# Patient Record
Sex: MALE | Race: WHITE | NOT HISPANIC OR LATINO | Employment: FULL TIME | URBAN - METROPOLITAN AREA
[De-identification: names, ages, dates, MRNs, and addresses within clinical notes are randomized per-mention and may not be internally consistent; named-entity substitution may affect disease eponyms.]

---

## 2022-06-15 ENCOUNTER — OFFICE VISIT (OUTPATIENT)
Dept: FAMILY MEDICINE | Facility: CLINIC | Age: 57
End: 2022-06-15
Payer: COMMERCIAL

## 2022-06-15 VITALS
RESPIRATION RATE: 16 BRPM | DIASTOLIC BLOOD PRESSURE: 80 MMHG | SYSTOLIC BLOOD PRESSURE: 130 MMHG | TEMPERATURE: 98.2 F | WEIGHT: 229 LBS | BODY MASS INDEX: 29.39 KG/M2 | OXYGEN SATURATION: 99 % | HEART RATE: 84 BPM | HEIGHT: 74 IN

## 2022-06-15 DIAGNOSIS — Z76.89 ENCOUNTER TO ESTABLISH CARE: Primary | ICD-10-CM

## 2022-06-15 PROCEDURE — 3008F BODY MASS INDEX DOCD: CPT | Performed by: NURSE PRACTITIONER

## 2022-06-15 PROCEDURE — 99396 PREV VISIT EST AGE 40-64: CPT | Performed by: NURSE PRACTITIONER

## 2022-06-15 ASSESSMENT — PATIENT HEALTH QUESTIONNAIRE - PHQ9: SUM OF ALL RESPONSES TO PHQ9 QUESTIONS 1 & 2: 0

## 2022-06-15 ASSESSMENT — ENCOUNTER SYMPTOMS
LIGHT-HEADEDNESS: 0
WHEEZING: 0
ABDOMINAL PAIN: 0
VOMITING: 0
DIARRHEA: 1
FATIGUE: 1
COUGH: 1
SHORTNESS OF BREATH: 1
HEADACHES: 0
CONSTIPATION: 0
NAUSEA: 0

## 2022-06-15 ASSESSMENT — PAIN SCALES - GENERAL: PAINLEVEL: 0-NO PAIN

## 2022-06-15 NOTE — PATIENT INSTRUCTIONS
Pt has hx of Afib, has been cardioverted x2. Pt states last cardioversion was in 2010.      Pt states he doesn't like to take medications, declined to take antiarrythmic.     Labs    Consider cardoilogy follow up, referred to Dr Lane    Today we discussed a healthy diet with fruits, vegetables, and low-fat items with a focus on complex carbohydrates. Regular physical activity is encouraged with a goal of 30 minutes of cardio most days of the week with some muscle strengthening.    Try to limit caffeine to less than 24 ounces per day, replenish with plenty of water. Hydration has a bigger impact on your health than you think!    Reviewed safe alcohol habits. If you drink while out of the house, plan for a designated . Never drink and drive. Avoid THC use, smoking, or vaping.    Reviewed safe sexual practices, if sexually active always use a condom to discourage STD transmission.     Routine assessments by specialists such as OBGYN, cardiology, GI, etc. were encouraged if applicable.     Watch for changes in bowel habits, especially black or bloody stools.     I encourage regular Opthalmology visit for eye check, Dr. Rupa Avila and Dr. Judie Mccloud in [place] are very good resources for this, their office number is [phone]    I also recommend routine Dermatology visits every 1-2 years for a skin check, Dr. Salty Thomas in [place] [phone] or Dr. Brenda Buitrago in [place] [phone].    Patient states he received covid vaccine x 2 doses with 1 booster    Fasting labs, follow up based on results.

## 2022-06-15 NOTE — PROGRESS NOTES
[facility]  [address] [place] [address]  [phone]     Reason for visit:   Chief Complaint   Patient presents with   • Establish Care      HPI   Alvaro Novak is a 56 y.o. male who presents for a routine well visit.        Employed- [employer]  Fun activities - gardening  Ever feel down/depressed - no   SI/HI -no  Diet - pretty good, works in produce  Doesn't smoke, no smokers at home.  Screen time - moderate  Alcohol - occasional  Drugs -  Menses -   Sleep - could always be better- would love to get more sleep  Last dental visit - due  Ophthalmology - due  Dermatology -  Seatbelt use -  Sexually active -   Mammogram -  Colonoscopy - cologuard 3 yrs ago  Cervical Cancer Screening -    Social History:  Social History     Social History Narrative   • Not on file       Medical History:  Past Medical History:   Diagnosis Date   • Hypertension        Surgical History:  History reviewed. No pertinent surgical history.      Family History:  History reviewed. No pertinent family history.    Allergies:  Yellow dye    Current Medications:  No current outpatient medications on file.     No current facility-administered medications for this visit.       Review of Systems:  Review of Systems   Constitutional: Positive for fatigue (tested positive for covid 6/3/22).   Respiratory: Positive for cough (post covid) and shortness of breath. Negative for wheezing.         Chest wall tenderness with coughing   Gastrointestinal: Positive for diarrhea (with covid, now resolved). Negative for abdominal pain, constipation, nausea and vomiting.   Neurological: Negative for light-headedness and headaches.       Objective     Vital Signs:  Vitals:    06/15/22 1139   BP: 130/80   Pulse: 84   Resp: 16   Temp: 36.8 °C (98.2 °F)   SpO2: 99%       BMI:  Body mass index is 29.4 kg/m².     Physical Exam  Constitutional:       Appearance: Normal appearance.   HENT:      Head: Normocephalic and  atraumatic.   Cardiovascular:      Rate and Rhythm: Normal rate and regular rhythm.      Heart sounds: Normal heart sounds.   Pulmonary:      Effort: Pulmonary effort is normal.      Breath sounds: Normal breath sounds.   Neurological:      General: No focal deficit present.      Mental Status: He is alert and oriented to person, place, and time.   Psychiatric:         Attention and Perception: Attention and perception normal.         Mood and Affect: Mood and affect normal.         Speech: Speech normal.         Behavior: Behavior normal. Behavior is cooperative.         Thought Content: Thought content normal.         Cognition and Memory: Cognition normal.         Judgment: Judgment normal.         Recent labs before today:     No results found for: WBC, HGB, HCT, PLT, CHOL, TRIG, HDL, LDLDIRECT, ALT, AST, NA, K, CL, CREATININE, BUN, CO2, TSH, PSA, INR, HGBA1C, MICROALBUR     Procedures   Assessment     Patient Instructions   Pt has hx of Afib, has been cardioverted x2. Pt states last cardioversion was in 2010.      Pt states he doesn't like to take medications, declined to take antiarrythmic.     Labs    Consider cardoilogy follow up, referred to Dr Lane    Today we discussed a healthy diet with fruits, vegetables, and low-fat items with a focus on complex carbohydrates. Regular physical activity is encouraged with a goal of 30 minutes of cardio most days of the week with some muscle strengthening.    Try to limit caffeine to less than 24 ounces per day, replenish with plenty of water. Hydration has a bigger impact on your health than you think!    Reviewed safe alcohol habits. If you drink while out of the house, plan for a designated . Never drink and drive. Avoid THC use, smoking, or vaping.    Reviewed safe sexual practices, if sexually active always use a condom to discourage STD transmission.     Routine assessments by specialists such as OBGYN, cardiology, GI, etc. were encouraged if applicable.      Watch for changes in bowel habits, especially black or bloody stools.     I encourage regular Opthalmology visit for eye check, Dr. Rupa Avila and Dr. Judie Mccloud in [place] are very good resources for this, their office number is [phone]    I also recommend routine Dermatology visits every 1-2 years for a skin check, Dr. Salty Thomas in [place] [phone] or Dr. Brenda Buitrago in [place] [phone].    Patient is UTD with immunizations.  Encouraged yearly flu shot in Oct/Nov.     Fasting labs, follow up based on results.           Problem List Items Addressed This Visit    None     Visit Diagnoses     Encounter to establish care    -  Primary    Relevant Orders    CBC    Comprehensive metabolic panel    Hemoglobin A1c    Lipid panel    PSA    Urinalysis with microscopic    Ambulatory referral to Cardiology                    Eric Beltran, ELKIN, [place]ST[place]  6/15/2022    This document was created using Dragon dictation software.  There might be some typographical errors due to this technology.

## 2022-06-17 LAB
ALBUMIN SERPL-MCNC: 4.2 G/DL (ref 3.8–4.9)
ALBUMIN/GLOB SERPL: 1.6 {RATIO} (ref 1.2–2.2)
ALP SERPL-CCNC: 81 IU/L (ref 44–121)
ALT SERPL-CCNC: 22 IU/L (ref 0–44)
APPEARANCE UR: CLEAR
AST SERPL-CCNC: 22 IU/L (ref 0–40)
BACTERIA #/AREA URNS HPF: NORMAL /[HPF]
BILIRUB SERPL-MCNC: 0.4 MG/DL (ref 0–1.2)
BILIRUB UR QL STRIP: NEGATIVE
BUN SERPL-MCNC: 17 MG/DL (ref 6–24)
BUN/CREAT SERPL: 17 (ref 9–20)
CALCIUM SERPL-MCNC: 9.5 MG/DL (ref 8.7–10.2)
CASTS URNS QL MICRO: NORMAL /LPF
CHLORIDE SERPL-SCNC: 103 MMOL/L (ref 96–106)
CHOLEST SERPL-MCNC: 293 MG/DL (ref 100–199)
CO2 SERPL-SCNC: 24 MMOL/L (ref 20–29)
COLOR UR: YELLOW
CREAT SERPL-MCNC: 0.99 MG/DL (ref 0.76–1.27)
EGFRCR SERPLBLD CKD-EPI 2021: 90 ML/MIN/1.73
EPI CELLS #/AREA URNS HPF: NORMAL /HPF (ref 0–10)
ERYTHROCYTE [DISTWIDTH] IN BLOOD BY AUTOMATED COUNT: 12 % (ref 11.6–15.4)
GLOBULIN SER CALC-MCNC: 2.6 G/DL (ref 1.5–4.5)
GLUCOSE SERPL-MCNC: 93 MG/DL (ref 65–99)
GLUCOSE UR QL STRIP: NEGATIVE
HBA1C MFR BLD: 5.6 % (ref 4.8–5.6)
HCT VFR BLD AUTO: 48.1 % (ref 37.5–51)
HDLC SERPL-MCNC: 65 MG/DL
HGB BLD-MCNC: 16.2 G/DL (ref 13–17.7)
HGB UR QL STRIP: NEGATIVE
KETONES UR QL STRIP: NEGATIVE
LDLC SERPL CALC-MCNC: 213 MG/DL (ref 0–99)
LEUKOCYTE ESTERASE UR QL STRIP: NEGATIVE
MCH RBC QN AUTO: 31.8 PG (ref 26.6–33)
MCHC RBC AUTO-ENTMCNC: 33.7 G/DL (ref 31.5–35.7)
MCV RBC AUTO: 94 FL (ref 79–97)
MICRO URNS: NORMAL
MICRO URNS: NORMAL
NITRITE UR QL STRIP: NEGATIVE
PH UR STRIP: 5.5 [PH] (ref 5–7.5)
PLATELET # BLD AUTO: 311 X10E3/UL (ref 150–450)
POTASSIUM SERPL-SCNC: 5 MMOL/L (ref 3.5–5.2)
PROT SERPL-MCNC: 6.8 G/DL (ref 6–8.5)
PROT UR QL STRIP: NEGATIVE
PSA SERPL-MCNC: 0.5 NG/ML (ref 0–4)
RBC # BLD AUTO: 5.1 X10E6/UL (ref 4.14–5.8)
RBC #/AREA URNS HPF: NORMAL /HPF (ref 0–2)
SODIUM SERPL-SCNC: 140 MMOL/L (ref 134–144)
SP GR UR STRIP: 1.02 (ref 1–1.03)
TRIGL SERPL-MCNC: 89 MG/DL (ref 0–149)
UROBILINOGEN UR STRIP-MCNC: 0.2 MG/DL (ref 0.2–1)
VLDLC SERPL CALC-MCNC: 15 MG/DL (ref 5–40)
WBC # BLD AUTO: 6.3 X10E3/UL (ref 3.4–10.8)
WBC #/AREA URNS HPF: NORMAL /HPF (ref 0–5)

## 2022-07-26 ENCOUNTER — TELEPHONE (OUTPATIENT)
Dept: FAMILY MEDICINE | Facility: CLINIC | Age: 57
End: 2022-07-26
Payer: COMMERCIAL

## 2022-10-20 ENCOUNTER — OFFICE VISIT (OUTPATIENT)
Dept: FAMILY MEDICINE | Facility: CLINIC | Age: 57
End: 2022-10-20
Payer: COMMERCIAL

## 2022-10-20 ENCOUNTER — TELEPHONE (OUTPATIENT)
Dept: FAMILY MEDICINE | Facility: CLINIC | Age: 57
End: 2022-10-20

## 2022-10-20 ENCOUNTER — HOSPITAL ENCOUNTER (OUTPATIENT)
Dept: RADIOLOGY | Age: 57
Discharge: HOME | End: 2022-10-20
Attending: STUDENT IN AN ORGANIZED HEALTH CARE EDUCATION/TRAINING PROGRAM
Payer: COMMERCIAL

## 2022-10-20 VITALS
OXYGEN SATURATION: 98 % | HEIGHT: 74 IN | SYSTOLIC BLOOD PRESSURE: 146 MMHG | RESPIRATION RATE: 16 BRPM | HEART RATE: 65 BPM | BODY MASS INDEX: 30.67 KG/M2 | WEIGHT: 239 LBS | DIASTOLIC BLOOD PRESSURE: 84 MMHG | TEMPERATURE: 98.2 F

## 2022-10-20 DIAGNOSIS — M62.838 MUSCLE SPASM: ICD-10-CM

## 2022-10-20 DIAGNOSIS — M54.2 ACUTE NECK PAIN: Primary | ICD-10-CM

## 2022-10-20 DIAGNOSIS — M54.2 ACUTE NECK PAIN: ICD-10-CM

## 2022-10-20 DIAGNOSIS — R03.0 ELEVATED BLOOD PRESSURE READING: ICD-10-CM

## 2022-10-20 PROCEDURE — 72040 X-RAY EXAM NECK SPINE 2-3 VW: CPT

## 2022-10-20 PROCEDURE — 3008F BODY MASS INDEX DOCD: CPT | Performed by: STUDENT IN AN ORGANIZED HEALTH CARE EDUCATION/TRAINING PROGRAM

## 2022-10-20 PROCEDURE — 99214 OFFICE O/P EST MOD 30 MIN: CPT | Performed by: STUDENT IN AN ORGANIZED HEALTH CARE EDUCATION/TRAINING PROGRAM

## 2022-10-20 RX ORDER — NAPROXEN 500 MG/1
500 TABLET ORAL 2 TIMES DAILY WITH MEALS
Qty: 30 TABLET | Refills: 0 | Status: SHIPPED | OUTPATIENT
Start: 2022-10-20 | End: 2022-10-20 | Stop reason: SDUPTHER

## 2022-10-20 RX ORDER — CYCLOBENZAPRINE HCL 5 MG
5 TABLET ORAL NIGHTLY PRN
Qty: 14 TABLET | Refills: 0 | Status: SHIPPED | OUTPATIENT
Start: 2022-10-20 | End: 2022-10-20 | Stop reason: SDUPTHER

## 2022-10-20 RX ORDER — NAPROXEN 500 MG/1
500 TABLET ORAL 2 TIMES DAILY WITH MEALS
Qty: 30 TABLET | Refills: 0 | Status: SHIPPED | OUTPATIENT
Start: 2022-10-20 | End: 2022-11-10 | Stop reason: SDUPTHER

## 2022-10-20 RX ORDER — CYCLOBENZAPRINE HCL 5 MG
5 TABLET ORAL NIGHTLY PRN
Qty: 14 TABLET | Refills: 0 | Status: SHIPPED | OUTPATIENT
Start: 2022-10-20 | End: 2022-11-10

## 2022-10-20 ASSESSMENT — ENCOUNTER SYMPTOMS
ARTHRALGIAS: 1
ABDOMINAL PAIN: 0
NECK PAIN: 1
NECK STIFFNESS: 1
LIGHT-HEADEDNESS: 0
CHEST TIGHTNESS: 0
HEADACHES: 0
DIZZINESS: 0
NUMBNESS: 1
ACTIVITY CHANGE: 1
SHORTNESS OF BREATH: 0

## 2022-10-20 NOTE — TELEPHONE ENCOUNTER
Namita, patient stopped back after his appointment to inform us that his pharmacy information has changed.    Please use:    [facility]  [address] [place] [address]  Store Phone:  [phone]    Please update and resend the most resent RX.    Thank you.    DALI MorejonR

## 2022-10-20 NOTE — ASSESSMENT & PLAN NOTE
Blood pressure elevated  Patient states that this reading is much better than it has been in the past  Advised patient to check his blood pressure readings at home and document on a log to bring to next appointment in 2 weeks.  Cut back on sodium intake to 1500 mg daily  Follow the DASH diet that was provided to you   Increase your physical activity to 150 minutes of moderate exercise per week  Discussed red flag signs to watch for including but not limited to chest pain, shortness of breath, headache  Follow-up in 2-3 weeks or earlier as needed

## 2022-10-20 NOTE — ASSESSMENT & PLAN NOTE
Prescribed naproxen twice daily with food  Prescribed Flexeril at bedtime for muscle spasms  Continue with ice or heat as needed  Ordered x-ray as patient has had neck pain for many years and has recently gotten more severe  Return to office if worsens or does not improve

## 2022-10-20 NOTE — PROGRESS NOTES
"Chief Complaint   Patient presents with    Neck Pain     Had chronic neck pain for years  Last Monday noticed left sided neck/shoulder pain getting worse, having trouble moving head left, right, up, down, feels neck popping sometimes, goes down to blade and spine,        Subjective      Patient ID: Alvaro Novak is a 57 y.o. male.  1965      HPI  States that he was a  and he has had neck pain for several years.   Onset: 3 days ago  Location: left side of neck and shoulder   Duration: constant   Characteristics: sharp shooting pain, \"driving a nail into my neck\"   Aggravated by: movement, works as grocer at [employer]   Associated with: sleep disturbance, numbness/tingling in his hands  Relieved by: tried muscle relaxant with some relief and Advil, self massage, icy-hot   Timing: constant  Severity: at its worst 8/10    The following have been reviewed and updated as appropriate in this visit:   Allergies  Meds  Problems       Review of Systems   Constitutional: Positive for activity change.   Respiratory: Negative for chest tightness and shortness of breath.    Cardiovascular: Negative for chest pain.   Gastrointestinal: Negative for abdominal pain.   Musculoskeletal: Positive for arthralgias, neck pain and neck stiffness.   Neurological: Positive for numbness. Negative for dizziness, light-headedness and headaches.       Objective     Vitals:    10/20/22 1332   BP: (!) 146/84   Pulse: 65   Resp: 16   Temp: 36.8 °C (98.2 °F)   TempSrc: Temporal   SpO2: 98%   Weight: 108 kg (239 lb)   Height: 1.88 m (6' 2\")     Body mass index is 30.69 kg/m².    Physical Exam  Vitals and nursing note reviewed.   Cardiovascular:      Rate and Rhythm: Normal rate and regular rhythm.      Heart sounds: Normal heart sounds.   Pulmonary:      Effort: No respiratory distress.      Breath sounds: Normal breath sounds.   Musculoskeletal:         General: Tenderness present.      Right shoulder: Normal.      Left shoulder: " Tenderness present. No deformity or bony tenderness. Decreased range of motion.      Cervical back: Spasms and tenderness present. No erythema or bony tenderness. Pain with movement present. Decreased range of motion.      Thoracic back: Normal.      Comments: Left-sided neck pain that is worse with extension, restricted in sidebending and rotation to the right   Neurological:      Mental Status: He is alert and oriented to person, place, and time.   Psychiatric:         Mood and Affect: Mood normal.         Behavior: Behavior normal.         Assessment/Plan   Diagnoses and all orders for this visit:    Acute neck pain (Primary)  Assessment & Plan:  Prescribed naproxen twice daily with food  Prescribed Flexeril at bedtime for muscle spasms  Continue with ice or heat as needed  Ordered x-ray as patient has had neck pain for many years and has recently gotten more severe  Return to office if worsens or does not improve    Orders:  -     X-RAY CERVICAL SPINE 2 OR 3 VIEWS; Future    Elevated blood pressure reading  Assessment & Plan:  Blood pressure elevated  Patient states that this reading is much better than it has been in the past  Advised patient to check his blood pressure readings at home and document on a log to bring to next appointment in 2 weeks.  Cut back on sodium intake to 1500 mg daily  Follow the DASH diet that was provided to you   Increase your physical activity to 150 minutes of moderate exercise per week  Discussed red flag signs to watch for including but not limited to chest pain, shortness of breath, headache  Follow-up in 2-3 weeks or earlier as needed        Muscle spasm      Return in about 3 weeks (around 11/10/2022), or if symptoms worsen or fail to improve, for BP check with PCP .

## 2022-10-21 DIAGNOSIS — M54.2 ACUTE NECK PAIN: Primary | ICD-10-CM

## 2022-11-10 ENCOUNTER — OFFICE VISIT (OUTPATIENT)
Dept: FAMILY MEDICINE | Facility: CLINIC | Age: 57
End: 2022-11-10
Payer: COMMERCIAL

## 2022-11-10 VITALS
HEART RATE: 59 BPM | WEIGHT: 238 LBS | DIASTOLIC BLOOD PRESSURE: 84 MMHG | BODY MASS INDEX: 30.54 KG/M2 | HEIGHT: 74 IN | OXYGEN SATURATION: 98 % | SYSTOLIC BLOOD PRESSURE: 124 MMHG | TEMPERATURE: 97.6 F

## 2022-11-10 DIAGNOSIS — M54.2 NECK PAIN: Primary | ICD-10-CM

## 2022-11-10 DIAGNOSIS — M54.2 ACUTE NECK PAIN: ICD-10-CM

## 2022-11-10 PROCEDURE — 99214 OFFICE O/P EST MOD 30 MIN: CPT | Performed by: NURSE PRACTITIONER

## 2022-11-10 PROCEDURE — 3008F BODY MASS INDEX DOCD: CPT | Performed by: NURSE PRACTITIONER

## 2022-11-10 RX ORDER — TIZANIDINE HYDROCHLORIDE 4 MG/1
4 CAPSULE, GELATIN COATED ORAL 3 TIMES DAILY
Qty: 90 CAPSULE | Refills: 0 | Status: SHIPPED | OUTPATIENT
Start: 2022-11-10 | End: 2023-02-27

## 2022-11-10 RX ORDER — NAPROXEN 500 MG/1
500 TABLET ORAL 2 TIMES DAILY WITH MEALS
Qty: 30 TABLET | Refills: 0 | Status: SHIPPED | OUTPATIENT
Start: 2022-11-10 | End: 2022-11-30

## 2022-11-10 ASSESSMENT — ENCOUNTER SYMPTOMS
WHEEZING: 0
NECK PAIN: 1
CHILLS: 0
HEADACHES: 0
NAUSEA: 0
SHORTNESS OF BREATH: 0
ABDOMINAL PAIN: 0
LIGHT-HEADEDNESS: 0
VOMITING: 0
DIARRHEA: 0
FEVER: 0
FATIGUE: 0
COUGH: 0
CONSTIPATION: 0
NECK STIFFNESS: 1

## 2022-11-10 NOTE — PATIENT INSTRUCTIONS
1) Physical therapy as prescribed by Dr Emmanuel    2) Refill Naprosyn    3) Zanaflex 4mg TID PRN    4) heat 10-15 minutes at a time, 2-3 times a day as desired.     5) If neck ROM does not improve with PT, consider chiropractic adjustment to reset.

## 2022-11-10 NOTE — PROGRESS NOTES
[facility]  [address] [place] [address]  [phone]     Reason for visit:   Chief Complaint   Patient presents with    Follow-up     Neck pain. It has improved but still bothering him. He completed the medication given at the last appointment. He also had an xray. He has had tingling and numbness in both arms and hands for years.      CYNTHIA Novak is a 57 y.o. male who presents with bilateral neck pain, had XR that shows cervical degenerative disc disease. Seen by Dr Emmanuel and has had neck spasm, Flexeril and Naprosyn given. Naprosyn helps sometimes.         Medical History:  Past Medical History:   Diagnosis Date    Hypertension        Surgical History:  History reviewed. No pertinent surgical history.    Social History:  Social History     Social History Narrative    Not on file       Family History:  Family History   Problem Relation Age of Onset    AVM Biological Mother     Diabetes Maternal Grandmother        Allergies:  Yellow dye    Current Medications:  Current Outpatient Medications   Medication Sig Dispense Refill    naproxen (NAPROSYN) 500 mg tablet Take 1 tablet (500 mg total) by mouth 2 (two) times a day with meals. 30 tablet 0    TiZANidine (ZANAFLEX) 4 mg capsule Take 1 capsule (4 mg total) by mouth 3 (three) times a day. 90 capsule 0     No current facility-administered medications for this visit.       Review of Systems:  Review of Systems   Constitutional: Negative for chills, fatigue and fever.   Respiratory: Negative for cough, shortness of breath and wheezing.    Cardiovascular: Negative for chest pain.   Gastrointestinal: Negative for abdominal pain, constipation, diarrhea, nausea and vomiting.   Musculoskeletal: Positive for neck pain and neck stiffness (decreased ROM, especially with rotation to the left).   Neurological: Negative for light-headedness and headaches.       Objective     Vital Signs:  Vitals:    11/10/22 1133   BP: 124/84    Pulse: (!) 59   Temp: 36.4 °C (97.6 °F)   SpO2: 98%       BMI:  Body mass index is 30.56 kg/m².     Physical Exam  Constitutional:       Appearance: Normal appearance.   Musculoskeletal:        Back:    Neurological:      General: No focal deficit present.      Mental Status: He is alert and oriented to person, place, and time.   Psychiatric:         Attention and Perception: Attention and perception normal.         Mood and Affect: Mood and affect normal.         Speech: Speech normal.         Behavior: Behavior normal. Behavior is cooperative.         Thought Content: Thought content normal.         Cognition and Memory: Cognition normal.         Judgment: Judgment normal.         Recent labs before today:     Lab Results   Component Value Date    WBC 6.3 06/16/2022    HGB 16.2 06/16/2022    HCT 48.1 06/16/2022     06/16/2022    CHOL 293 (H) 06/16/2022    TRIG 89 06/16/2022    HDL 65 06/16/2022    ALT 22 06/16/2022    AST 22 06/16/2022     06/16/2022    K 5.0 06/16/2022     06/16/2022    CREATININE 0.99 06/16/2022    BUN 17 06/16/2022    CO2 24 06/16/2022    PSA 0.5 06/16/2022    HGBA1C 5.6 06/16/2022        Procedures   Assessment     Patient Instructions   1) Physical therapy as prescribed by Dr Emmanuel    2) Refill Naprosyn    3) Zanaflex 4mg TID PRN    4) heat 10-15 minutes at a time, 2-3 times a day as desired.     5) If neck ROM does not improve with PT, consider chiropractic adjustment to reset.     [unfilled]  Problem List Items Addressed This Visit        Nervous    Acute neck pain    Relevant Medications    naproxen (NAPROSYN) 500 mg tablet   Other Visit Diagnoses     Neck pain    -  Primary             The total time spent ON THE DAY OF THE VISIT was 35 minutes, including preparing to see the patient, obtaining and reviewing separately obtained history, performing medically appropriate examination or evaluation, counseling and educating patient/family/caregiver, ordering  medications, tests or procedures, referring and communicating with other health care professionals, documenting clinical information in electronic or other record, independently interpreting and communicating results to patient/family/caregiver, and/or care coordination.             Eric Beltran DNP, [place]  11/10/2022      This document was created using Dragon dictation software.  There might be some typographical errors due to this technology.

## 2022-11-30 DIAGNOSIS — M54.2 ACUTE NECK PAIN: ICD-10-CM

## 2022-11-30 RX ORDER — NAPROXEN 500 MG/1
TABLET ORAL
Qty: 30 TABLET | Refills: 0 | Status: SHIPPED | OUTPATIENT
Start: 2022-11-30 | End: 2023-01-05

## 2022-12-02 ENCOUNTER — TELEPHONE (OUTPATIENT)
Dept: FAMILY MEDICINE | Facility: CLINIC | Age: 57
End: 2022-12-02
Payer: COMMERCIAL

## 2022-12-02 DIAGNOSIS — M54.2 ACUTE NECK PAIN: Primary | ICD-10-CM

## 2022-12-02 NOTE — TELEPHONE ENCOUNTER
Anna from [facility] called requesting a new order with the date of 12/1. They can only accept orders within 30 days of the day they were written. Please advise.    Anna can be reached at [phone].    Please fax order to [phone].    Thank you!  Peyton

## 2022-12-06 NOTE — TELEPHONE ENCOUNTER
Anna called from [facility] stating that the new order has the wrong date on it. The date needs to reflect 12/1 as the start date.     Thank you!  Peyton

## 2023-01-05 DIAGNOSIS — M54.2 ACUTE NECK PAIN: ICD-10-CM

## 2023-01-05 RX ORDER — NAPROXEN 500 MG/1
TABLET ORAL
Qty: 30 TABLET | Refills: 0 | Status: SHIPPED | OUTPATIENT
Start: 2023-01-05 | End: 2023-02-27

## 2023-02-24 DIAGNOSIS — M54.2 ACUTE NECK PAIN: ICD-10-CM

## 2023-02-27 RX ORDER — TIZANIDINE HYDROCHLORIDE 4 MG/1
CAPSULE, GELATIN COATED ORAL
Qty: 90 CAPSULE | Refills: 0 | Status: SHIPPED | OUTPATIENT
Start: 2023-02-27 | End: 2023-04-06 | Stop reason: SDUPTHER

## 2023-02-27 RX ORDER — NAPROXEN 500 MG/1
TABLET ORAL
Qty: 30 TABLET | Refills: 0 | Status: SHIPPED | OUTPATIENT
Start: 2023-02-27 | End: 2023-04-06 | Stop reason: SDUPTHER

## 2023-04-06 ENCOUNTER — OFFICE VISIT (OUTPATIENT)
Dept: FAMILY MEDICINE | Facility: CLINIC | Age: 58
End: 2023-04-06
Payer: COMMERCIAL

## 2023-04-06 VITALS
WEIGHT: 249 LBS | TEMPERATURE: 97.3 F | BODY MASS INDEX: 31.95 KG/M2 | HEART RATE: 78 BPM | SYSTOLIC BLOOD PRESSURE: 146 MMHG | OXYGEN SATURATION: 98 % | RESPIRATION RATE: 18 BRPM | DIASTOLIC BLOOD PRESSURE: 100 MMHG | HEIGHT: 74 IN

## 2023-04-06 DIAGNOSIS — M54.2 NECK PAIN: Primary | ICD-10-CM

## 2023-04-06 DIAGNOSIS — M54.2 ACUTE NECK PAIN: ICD-10-CM

## 2023-04-06 PROCEDURE — 99214 OFFICE O/P EST MOD 30 MIN: CPT | Performed by: NURSE PRACTITIONER

## 2023-04-06 PROCEDURE — 3008F BODY MASS INDEX DOCD: CPT | Performed by: NURSE PRACTITIONER

## 2023-04-06 RX ORDER — NAPROXEN 500 MG/1
500 TABLET ORAL 2 TIMES DAILY WITH MEALS
Qty: 30 TABLET | Refills: 0 | Status: SHIPPED | OUTPATIENT
Start: 2023-04-06 | End: 2023-07-06

## 2023-04-06 RX ORDER — TIZANIDINE HYDROCHLORIDE 4 MG/1
4 CAPSULE, GELATIN COATED ORAL 3 TIMES DAILY
Qty: 30 CAPSULE | Refills: 0 | Status: SHIPPED | OUTPATIENT
Start: 2023-04-06 | End: 2023-07-06

## 2023-04-06 ASSESSMENT — ENCOUNTER SYMPTOMS
HEADACHES: 0
CHILLS: 0
WHEEZING: 0
CONSTIPATION: 0
ABDOMINAL PAIN: 0
COUGH: 0
DIARRHEA: 0
SHORTNESS OF BREATH: 0
LIGHT-HEADEDNESS: 0
FATIGUE: 0
FEVER: 0
VOMITING: 0
NAUSEA: 0

## 2023-04-06 NOTE — PATIENT INSTRUCTIONS
Recurrent neck pain, with associated muscle spasm. L trap and cervical paraspinal spasm, R lower thoracic spasm. Heat 10-15 minutes at a time, 2-3 times a day as needed. Tizanidine 4mg up to 3 times daily as needed. Naprosyn 500mg twice daily with food as needed for pain. Follow up with Dr Rodriguez in [place] as discussed.

## 2023-07-06 DIAGNOSIS — M54.2 ACUTE NECK PAIN: ICD-10-CM

## 2023-07-06 RX ORDER — NAPROXEN 500 MG/1
500 TABLET ORAL 2 TIMES DAILY WITH MEALS
Qty: 30 TABLET | Refills: 0 | Status: SHIPPED
Start: 2023-07-06 | End: 2024-04-17

## 2023-07-06 RX ORDER — TIZANIDINE HYDROCHLORIDE 4 MG/1
4 CAPSULE, GELATIN COATED ORAL 3 TIMES DAILY
Qty: 30 CAPSULE | Refills: 0 | Status: SHIPPED
Start: 2023-07-06 | End: 2024-04-17

## 2024-04-17 ENCOUNTER — OFFICE VISIT (OUTPATIENT)
Dept: FAMILY MEDICINE | Facility: CLINIC | Age: 59
End: 2024-04-17
Payer: COMMERCIAL

## 2024-04-17 VITALS
WEIGHT: 255 LBS | HEIGHT: 74 IN | SYSTOLIC BLOOD PRESSURE: 136 MMHG | RESPIRATION RATE: 18 BRPM | OXYGEN SATURATION: 97 % | BODY MASS INDEX: 32.73 KG/M2 | TEMPERATURE: 97.9 F | DIASTOLIC BLOOD PRESSURE: 82 MMHG | HEART RATE: 82 BPM

## 2024-04-17 DIAGNOSIS — B35.4 TINEA CORPORIS: Primary | ICD-10-CM

## 2024-04-17 PROCEDURE — 3008F BODY MASS INDEX DOCD: CPT | Performed by: NURSE PRACTITIONER

## 2024-04-17 PROCEDURE — 99213 OFFICE O/P EST LOW 20 MIN: CPT | Performed by: NURSE PRACTITIONER

## 2024-04-17 RX ORDER — FLUCONAZOLE 150 MG/1
150 TABLET ORAL WEEKLY
Qty: 3 TABLET | Refills: 0 | Status: SHIPPED | OUTPATIENT
Start: 2024-04-17 | End: 2024-06-11 | Stop reason: SDUPTHER

## 2024-04-17 ASSESSMENT — ENCOUNTER SYMPTOMS
FEVER: 0
ABDOMINAL PAIN: 0
HEADACHES: 0
FATIGUE: 0
WHEEZING: 0
CHILLS: 0
COUGH: 0
DIFFICULTY URINATING: 0
VOMITING: 0
SHORTNESS OF BREATH: 0
DIARRHEA: 0
CONSTIPATION: 0
DYSURIA: 0
LIGHT-HEADEDNESS: 0
NAUSEA: 0

## 2024-04-17 ASSESSMENT — PATIENT HEALTH QUESTIONNAIRE - PHQ9: SUM OF ALL RESPONSES TO PHQ9 QUESTIONS 1 & 2: 0

## 2024-04-17 NOTE — PROGRESS NOTES
[facility]  [address] [place] [address]  [phone]     Reason for visit:   Chief Complaint   Patient presents with    Rash     Ring worm on his buttocks. Selsun Blue helped temporarily, but now it's back and worse.      HPI   Alvaro Novak is a 58 y.o. male who presents with tinea corporis on buttocks        Medical History:  Past Medical History:   Diagnosis Date    Hypertension        Surgical History:  History reviewed. No pertinent surgical history.    Social History:  Social History     Social History Narrative    Not on file       Family History:  Family History   Problem Relation Age of Onset    AVM Biological Mother     Diabetes Maternal Grandmother        Allergies:  Yellow dye    Current Medications:  Current Outpatient Medications   Medication Sig Dispense Refill    fluconazole (DIFLUCAN) 150 mg tablet Take 1 tablet (150 mg total) by mouth once a week. 3 tablet 0     No current facility-administered medications for this visit.       Review of Systems:  Review of Systems   Constitutional:  Negative for chills, fatigue and fever.   Respiratory:  Negative for cough, shortness of breath and wheezing.    Cardiovascular:  Negative for chest pain.   Gastrointestinal:  Negative for abdominal pain, constipation, diarrhea, nausea and vomiting.   Genitourinary:  Negative for difficulty urinating and dysuria.   Skin:  Positive for rash (bilateral buttocks, tinea).   Neurological:  Negative for light-headedness and headaches.       Objective     Vital Signs:  Vitals:    04/17/24 1051   BP: 136/82   Pulse: 82   Resp: 18   Temp: 36.6 °C (97.9 °F)   SpO2: 97%       BMI:  Body mass index is 32.74 kg/m².     Physical Exam  Constitutional:       Appearance: Normal appearance.   HENT:      Head: Normocephalic and atraumatic.   Cardiovascular:      Rate and Rhythm: Normal rate and regular rhythm.      Heart sounds: Normal heart sounds.   Pulmonary:      Effort: Pulmonary effort is  normal.      Breath sounds: Normal breath sounds.   Neurological:      General: No focal deficit present.      Mental Status: He is alert and oriented to person, place, and time.   Psychiatric:         Attention and Perception: Attention and perception normal.         Mood and Affect: Mood and affect normal.         Speech: Speech normal.         Behavior: Behavior normal. Behavior is cooperative.         Thought Content: Thought content normal.         Cognition and Memory: Cognition and memory normal.         Judgment: Judgment normal.         Recent labs before today:     Lab Results   Component Value Date    WBC 6.3 06/16/2022    HGB 16.2 06/16/2022    HCT 48.1 06/16/2022     06/16/2022    CHOL 293 (H) 06/16/2022    TRIG 89 06/16/2022    HDL 65 06/16/2022    ALT 22 06/16/2022    AST 22 06/16/2022     06/16/2022    K 5.0 06/16/2022     06/16/2022    CREATININE 0.99 06/16/2022    BUN 17 06/16/2022    CO2 24 06/16/2022    PSA 0.5 06/16/2022    HGBA1C 5.6 06/16/2022        Procedures   Assessment     Patient Instructions   Tinea, refractory to prior attempts with selenium to resolve it. Diflucan 150mg once weekly x 3 weeks. If not improving after 2 doses, call and I will add ketoconazole topical  [unfilled]  Problem List Items Addressed This Visit    None  Visit Diagnoses       Tinea corporis    -  Primary    Relevant Medications    fluconazole (DIFLUCAN) 150 mg tablet               The total time spent ON THE DAY OF THE VISIT was 25 minutes, including preparing to see the patient, obtaining and reviewing separately obtained history, performing medically appropriate examination or evaluation, counseling and educating patient/family/caregiver, ordering medications, tests or procedures, referring and communicating with other health care professionals, documenting clinical information in electronic or other record, independently interpreting and communicating results to patient/family/caregiver,  and/or care coordination.             Eric Beltran, ELKIN, [place]  4/17/2024      This document was created using Dragon dictation software.  There might be some typographical errors due to this technology.

## 2024-04-17 NOTE — PATIENT INSTRUCTIONS
Tinea, refractory to prior attempts with selenium to resolve it. Diflucan 150mg once weekly x 3 weeks. If not improving after 2 doses, call and I will add ketoconazole topical

## 2024-06-11 ENCOUNTER — OFFICE VISIT (OUTPATIENT)
Dept: FAMILY MEDICINE | Facility: CLINIC | Age: 59
End: 2024-06-11
Payer: COMMERCIAL

## 2024-06-11 VITALS
HEART RATE: 83 BPM | DIASTOLIC BLOOD PRESSURE: 80 MMHG | RESPIRATION RATE: 18 BRPM | OXYGEN SATURATION: 98 % | WEIGHT: 262 LBS | BODY MASS INDEX: 33.62 KG/M2 | HEIGHT: 74 IN | SYSTOLIC BLOOD PRESSURE: 128 MMHG | TEMPERATURE: 97 F

## 2024-06-11 DIAGNOSIS — M79.631 RIGHT FOREARM PAIN: ICD-10-CM

## 2024-06-11 DIAGNOSIS — Z00.00 ROUTINE HEALTH MAINTENANCE: ICD-10-CM

## 2024-06-11 DIAGNOSIS — B35.4 TINEA CORPORIS: Primary | ICD-10-CM

## 2024-06-11 PROCEDURE — 99213 OFFICE O/P EST LOW 20 MIN: CPT | Performed by: NURSE PRACTITIONER

## 2024-06-11 PROCEDURE — 3008F BODY MASS INDEX DOCD: CPT | Performed by: NURSE PRACTITIONER

## 2024-06-11 RX ORDER — FLUCONAZOLE 150 MG/1
150 TABLET ORAL WEEKLY
Qty: 4 TABLET | Refills: 0 | Status: SHIPPED
Start: 2024-06-11 | End: 2024-07-24

## 2024-06-11 ASSESSMENT — ENCOUNTER SYMPTOMS
DIARRHEA: 1
FATIGUE: 0
VOMITING: 0
LIGHT-HEADEDNESS: 0
FEVER: 0
SHORTNESS OF BREATH: 0
ABDOMINAL PAIN: 0
WHEEZING: 0
NAUSEA: 0
COUGH: 0
CONSTIPATION: 0
HEADACHES: 0
CHILLS: 0

## 2024-06-11 NOTE — PATIENT INSTRUCTIONS
1) Refill fluconazole for tinea corporis    2) R forearm pain x 1 year after an fall at work. XR forearm    Follow up based on results.

## 2024-06-11 NOTE — PROGRESS NOTES
[facility]  [address] [place] [address]  [phone]     Reason for visit:   Chief Complaint   Patient presents with    Follow-up     Would like a refill of the diflucan.    He fell about a year ago at work and didn't think he was hurt. He was carrying a metal fryer basket and when he fell, he hurt his forearms. He now has some discomfort that he just dealt with in the right aide. Burning, sharp.      HPI   Alvaro Novak is a 58 y.o. male who presents with follow up, requesting refill of diflucan also had a fall about a year ago at work and has residual pain in R forearm        Medical History:  Past Medical History:   Diagnosis Date    Hypertension        Surgical History:  History reviewed. No pertinent surgical history.    Social History:  Social History     Social History Narrative    Not on file       Family History:  Family History   Problem Relation Age of Onset    AVM Biological Mother     Diabetes Maternal Grandmother        Allergies:  Yellow dye    Current Medications:  Current Outpatient Medications   Medication Sig Dispense Refill    fluconazole (DIFLUCAN) 150 mg tablet Take 1 tablet (150 mg total) by mouth once a week. (Patient not taking: Reported on 6/11/2024) 3 tablet 0     No current facility-administered medications for this visit.       Review of Systems:  Review of Systems   Constitutional:  Negative for chills, fatigue and fever.   Respiratory:  Negative for cough, shortness of breath and wheezing.    Cardiovascular:  Negative for chest pain.   Gastrointestinal:  Positive for diarrhea (occasional). Negative for abdominal pain, constipation, nausea and vomiting.   Neurological:  Negative for light-headedness and headaches.       Objective     Vital Signs:  Vitals:    06/11/24 1629   BP: 128/80   Pulse: 83   Resp: 18   Temp: 36.1 °C (97 °F)   SpO2: 98%       BMI:  Body mass index is 33.64 kg/m².     Physical Exam  Constitutional:       Appearance: Normal  appearance.   HENT:      Head: Normocephalic and atraumatic.   Cardiovascular:      Rate and Rhythm: Normal rate and regular rhythm.      Heart sounds: Normal heart sounds.   Pulmonary:      Effort: Pulmonary effort is normal.      Breath sounds: Normal breath sounds.   Neurological:      General: No focal deficit present.      Mental Status: He is alert and oriented to person, place, and time.   Psychiatric:         Attention and Perception: Attention and perception normal.         Mood and Affect: Mood and affect normal.         Speech: Speech normal.         Behavior: Behavior normal. Behavior is cooperative.         Thought Content: Thought content normal.         Cognition and Memory: Cognition and memory normal.         Judgment: Judgment normal.         Recent labs before today:     Lab Results   Component Value Date    WBC 6.3 06/16/2022    HGB 16.2 06/16/2022    HCT 48.1 06/16/2022     06/16/2022    CHOL 293 (H) 06/16/2022    TRIG 89 06/16/2022    HDL 65 06/16/2022    ALT 22 06/16/2022    AST 22 06/16/2022     06/16/2022    K 5.0 06/16/2022     06/16/2022    CREATININE 0.99 06/16/2022    BUN 17 06/16/2022    CO2 24 06/16/2022    PSA 0.5 06/16/2022    HGBA1C 5.6 06/16/2022        Procedures   Assessment     Patient Instructions   1) Refill fluconazole for tinea corporis    2) R forearm pain x 1 year after an fall at work. XR forearm    Follow up based on results.   [unfilled]  Problem List Items Addressed This Visit    None  Visit Diagnoses       Tinea corporis    -  Primary    Right forearm pain                   The total time spent ON THE DAY OF THE VISIT was 20 minutes, including preparing to see the patient, obtaining and reviewing separately obtained history, performing medically appropriate examination or evaluation, counseling and educating patient/family/caregiver, ordering medications, tests or procedures, referring and communicating with other health care professionals,  documenting clinical information in electronic or other record, independently interpreting and communicating results to patient/family/caregiver, and/or care coordination.             Eric Beltran DNP, CRNP  6/11/2024      This document was created using Dragon dictation software.  There might be some typographical errors due to this technology.

## 2024-06-19 ENCOUNTER — HOSPITAL ENCOUNTER (OUTPATIENT)
Dept: RADIOLOGY | Age: 59
Discharge: HOME | End: 2024-06-19
Attending: NURSE PRACTITIONER
Payer: COMMERCIAL

## 2024-06-19 DIAGNOSIS — M79.631 RIGHT FOREARM PAIN: ICD-10-CM

## 2024-06-19 PROCEDURE — 73090 X-RAY EXAM OF FOREARM: CPT | Mod: RT

## 2024-06-20 ENCOUNTER — TELEPHONE (OUTPATIENT)
Dept: FAMILY MEDICINE | Facility: CLINIC | Age: 59
End: 2024-06-20
Payer: COMMERCIAL

## 2024-06-28 LAB
ALBUMIN SERPL-MCNC: 4.3 G/DL (ref 3.8–4.9)
ALP SERPL-CCNC: 88 IU/L (ref 44–121)
ALT SERPL-CCNC: 37 IU/L (ref 0–44)
APPEARANCE UR: CLEAR
AST SERPL-CCNC: 25 IU/L (ref 0–40)
BACTERIA #/AREA URNS HPF: NORMAL /[HPF]
BILIRUB SERPL-MCNC: 0.5 MG/DL (ref 0–1.2)
BILIRUB UR QL STRIP: NEGATIVE
BUN SERPL-MCNC: 23 MG/DL (ref 6–24)
BUN/CREAT SERPL: 22 (ref 9–20)
CALCIUM SERPL-MCNC: 10 MG/DL (ref 8.7–10.2)
CASTS URNS QL MICRO: NORMAL /LPF
CHLORIDE SERPL-SCNC: 103 MMOL/L (ref 96–106)
CHOLEST SERPL-MCNC: 315 MG/DL (ref 100–199)
CO2 SERPL-SCNC: 24 MMOL/L (ref 20–29)
COLOR UR: YELLOW
CREAT SERPL-MCNC: 1.03 MG/DL (ref 0.76–1.27)
EGFRCR SERPLBLD CKD-EPI 2021: 85 ML/MIN/1.73
EPI CELLS #/AREA URNS HPF: NORMAL /HPF (ref 0–10)
ERYTHROCYTE [DISTWIDTH] IN BLOOD BY AUTOMATED COUNT: 12.3 % (ref 11.6–15.4)
GGT SERPL-CCNC: 82 IU/L (ref 0–65)
GLOBULIN SER CALC-MCNC: 2.9 G/DL (ref 1.5–4.5)
GLUCOSE SERPL-MCNC: 102 MG/DL (ref 70–99)
GLUCOSE UR QL STRIP: NEGATIVE
HBA1C MFR BLD: 5.5 % (ref 4.8–5.6)
HCT VFR BLD AUTO: 48.5 % (ref 37.5–51)
HDLC SERPL-MCNC: 73 MG/DL
HGB BLD-MCNC: 16.6 G/DL (ref 13–17.7)
HGB UR QL STRIP: NEGATIVE
KETONES UR QL STRIP: NEGATIVE
LC LDL CALC COMMENT: ABNORMAL
LDLC SERPL CALC-MCNC: 228 MG/DL (ref 0–99)
LEUKOCYTE ESTERASE UR QL STRIP: NEGATIVE
MCH RBC QN AUTO: 32.4 PG (ref 26.6–33)
MCHC RBC AUTO-ENTMCNC: 34.2 G/DL (ref 31.5–35.7)
MCV RBC AUTO: 95 FL (ref 79–97)
MICRO URNS: NORMAL
MICRO URNS: NORMAL
NITRITE UR QL STRIP: NEGATIVE
PH UR STRIP: 5.5 [PH] (ref 5–7.5)
PLATELET # BLD AUTO: 281 X10E3/UL (ref 150–450)
POTASSIUM SERPL-SCNC: 5 MMOL/L (ref 3.5–5.2)
PROT SERPL-MCNC: 7.2 G/DL (ref 6–8.5)
PROT UR QL STRIP: NEGATIVE
PSA SERPL-MCNC: 0.3 NG/ML (ref 0–4)
RBC # BLD AUTO: 5.12 X10E6/UL (ref 4.14–5.8)
RBC #/AREA URNS HPF: NORMAL /HPF (ref 0–2)
SODIUM SERPL-SCNC: 139 MMOL/L (ref 134–144)
SP GR UR STRIP: 1.02 (ref 1–1.03)
TRIGL SERPL-MCNC: 86 MG/DL (ref 0–149)
UROBILINOGEN UR STRIP-MCNC: 0.2 MG/DL (ref 0.2–1)
VLDLC SERPL CALC-MCNC: 14 MG/DL (ref 5–40)
WBC # BLD AUTO: 6.6 X10E3/UL (ref 3.4–10.8)
WBC #/AREA URNS HPF: NORMAL /HPF (ref 0–5)

## 2024-07-24 ENCOUNTER — OFFICE VISIT (OUTPATIENT)
Dept: FAMILY MEDICINE | Facility: CLINIC | Age: 59
End: 2024-07-24
Payer: COMMERCIAL

## 2024-07-24 VITALS
OXYGEN SATURATION: 97 % | HEART RATE: 65 BPM | TEMPERATURE: 97.4 F | WEIGHT: 265 LBS | DIASTOLIC BLOOD PRESSURE: 96 MMHG | BODY MASS INDEX: 34.01 KG/M2 | RESPIRATION RATE: 16 BRPM | SYSTOLIC BLOOD PRESSURE: 142 MMHG | HEIGHT: 74 IN

## 2024-07-24 DIAGNOSIS — E78.5 HYPERLIPIDEMIA, UNSPECIFIED HYPERLIPIDEMIA TYPE: Primary | ICD-10-CM

## 2024-07-24 DIAGNOSIS — R03.0 ELEVATED BLOOD PRESSURE READING: ICD-10-CM

## 2024-07-24 PROCEDURE — 3008F BODY MASS INDEX DOCD: CPT | Performed by: NURSE PRACTITIONER

## 2024-07-24 PROCEDURE — 99214 OFFICE O/P EST MOD 30 MIN: CPT | Performed by: NURSE PRACTITIONER

## 2024-07-24 RX ORDER — ATORVASTATIN CALCIUM 10 MG/1
10 TABLET, FILM COATED ORAL DAILY
Qty: 90 TABLET | Refills: 1 | Status: SHIPPED | OUTPATIENT
Start: 2024-07-24 | End: 2025-01-20

## 2024-07-24 RX ORDER — EZETIMIBE 10 MG/1
10 TABLET ORAL NIGHTLY
Qty: 90 TABLET | Refills: 1 | Status: SHIPPED | OUTPATIENT
Start: 2024-07-24 | End: 2025-01-20

## 2024-07-24 ASSESSMENT — ENCOUNTER SYMPTOMS
NAUSEA: 0
FEVER: 0
DIARRHEA: 0
LIGHT-HEADEDNESS: 0
FATIGUE: 0
VOMITING: 0
WHEEZING: 0
ABDOMINAL PAIN: 0
CONSTIPATION: 0
COUGH: 0
SHORTNESS OF BREATH: 0
CHILLS: 0
HEADACHES: 0

## 2024-07-24 NOTE — PROGRESS NOTES
[facility]  [address] [place] [address]  [phone]     Reason for visit:   Chief Complaint   Patient presents with    Follow-up      HPI   Alvaro Novak is a 58 y.o. male who presents with hyperlipidemia        Medical History:  Past Medical History:   Diagnosis Date    Hypertension        Surgical History:  No past surgical history on file.    Social History:  Social History     Social History Narrative    Not on file       Family History:  Family History   Problem Relation Age of Onset    AVM Biological Mother     Diabetes Maternal Grandmother        Allergies:  Yellow dye    Current Medications:  No current outpatient medications on file.     No current facility-administered medications for this visit.       Review of Systems:  Review of Systems   Constitutional:  Negative for chills, fatigue and fever.   Respiratory:  Negative for cough, shortness of breath and wheezing.    Cardiovascular:  Negative for chest pain.   Gastrointestinal:  Negative for abdominal pain, constipation, diarrhea, nausea and vomiting.   Neurological:  Negative for light-headedness and headaches.       Objective     Vital Signs:  Vitals:    07/24/24 1301   BP: (!) 142/96   Pulse: 65   Resp: 16   Temp: 36.3 °C (97.4 °F)   SpO2: 97%       BMI:  Body mass index is 34.02 kg/m².     Physical Exam  Constitutional:       Appearance: Normal appearance.   HENT:      Head: Normocephalic and atraumatic.   Cardiovascular:      Rate and Rhythm: Normal rate and regular rhythm.      Heart sounds: Normal heart sounds.   Pulmonary:      Effort: Pulmonary effort is normal.      Breath sounds: Normal breath sounds.   Neurological:      General: No focal deficit present.      Mental Status: He is alert and oriented to person, place, and time.   Psychiatric:         Attention and Perception: Attention and perception normal.         Mood and Affect: Mood and affect normal.         Speech: Speech normal.         Behavior:  Behavior normal. Behavior is cooperative.         Thought Content: Thought content normal.         Cognition and Memory: Cognition and memory normal.         Judgment: Judgment normal.         Recent labs before today:     Lab Results   Component Value Date    WBC 6.6 06/27/2024    HGB 16.6 06/27/2024    HCT 48.5 06/27/2024     06/27/2024    CHOL 315 (H) 06/27/2024    TRIG 86 06/27/2024    HDL 73 06/27/2024    ALT 37 06/27/2024    AST 25 06/27/2024     06/27/2024    K 5.0 06/27/2024     06/27/2024    CREATININE 1.03 06/27/2024    BUN 23 06/27/2024    CO2 24 06/27/2024    PSA 0.3 06/27/2024    HGBA1C 5.5 06/27/2024        Procedures   Assessment     There are no Patient Instructions on file for this visit.  [unfilled]  Problem List Items Addressed This Visit    None         The total time spent ON THE DAY OF THE VISIT was 30 minutes, including preparing to see the patient, obtaining and reviewing separately obtained history, performing medically appropriate examination or evaluation, counseling and educating patient/family/caregiver, ordering medications, tests or procedures, referring and communicating with other health care professionals, documenting clinical information in electronic or other record, independently interpreting and communicating results to patient/family/caregiver, and/or care coordination.             Eric Beltran DNP, CONSTANCE  7/24/2024      This document was created using Dragon dictation software.  There might be some typographical errors due to this technology.

## 2024-07-24 NOTE — PATIENT INSTRUCTIONS
Discussed high LDL, trial ezetimibe and atorvastatin. CT Calcium score ordered. DASH information given

## 2024-09-26 ENCOUNTER — APPOINTMENT (RX ONLY)
Dept: URBAN - METROPOLITAN AREA CLINIC 374 | Facility: CLINIC | Age: 59
Setting detail: DERMATOLOGY
End: 2024-09-26

## 2024-09-26 DIAGNOSIS — L91.8 OTHER HYPERTROPHIC DISORDERS OF THE SKIN: ICD-10-CM | Status: UNCHANGED

## 2024-09-26 DIAGNOSIS — B07.8 OTHER VIRAL WARTS: ICD-10-CM | Status: INADEQUATELY CONTROLLED

## 2024-09-26 DIAGNOSIS — L82.1 OTHER SEBORRHEIC KERATOSIS: ICD-10-CM | Status: UNCHANGED

## 2024-09-26 DIAGNOSIS — B35.4 TINEA CORPORIS: ICD-10-CM | Status: INADEQUATELY CONTROLLED

## 2024-09-26 DIAGNOSIS — L44.8 OTHER SPECIFIED PAPULOSQUAMOUS DISORDERS: ICD-10-CM | Status: UNCHANGED

## 2024-09-26 PROCEDURE — ? COUNSELING

## 2024-09-26 PROCEDURE — ? PRESCRIPTION MEDICATION MANAGEMENT

## 2024-09-26 PROCEDURE — 99203 OFFICE O/P NEW LOW 30 MIN: CPT | Mod: 25

## 2024-09-26 PROCEDURE — 17110 DESTRUCTION B9 LES UP TO 14: CPT

## 2024-09-26 PROCEDURE — ? COSMETIC QUOTE

## 2024-09-26 PROCEDURE — ? PRESCRIPTION

## 2024-09-26 PROCEDURE — ? LIQUID NITROGEN

## 2024-09-26 RX ORDER — BUTENAFINE HYDROCHLORIDE 10 MG/G
CREAM TOPICAL BID
Qty: 60 | Refills: 2 | Status: ERX | COMMUNITY
Start: 2024-09-26

## 2024-09-26 RX ORDER — IMIQUIMOD 12.5 MG/.25G
CREAM TOPICAL
Qty: 24 | Refills: 2 | Status: ERX | COMMUNITY
Start: 2024-09-26

## 2024-09-26 RX ADMIN — BUTENAFINE HYDROCHLORIDE: 10 CREAM TOPICAL at 00:00

## 2024-09-26 RX ADMIN — IMIQUIMOD: 12.5 CREAM TOPICAL at 00:00

## 2024-09-26 ASSESSMENT — LOCATION DETAILED DESCRIPTION DERM
LOCATION DETAILED: RIGHT RADIAL DORSAL HAND
LOCATION DETAILED: LEFT PROXIMAL RADIAL DORSAL MIDDLE FINGER
LOCATION DETAILED: LEFT DORSAL RING METACARPOPHALANGEAL JOINT
LOCATION DETAILED: LEFT PROXIMAL DORSAL INDEX FINGER
LOCATION DETAILED: 3RD WEB SPACE RIGHT HAND
LOCATION DETAILED: RIGHT DISTAL PALMAR INDEX FINGER
LOCATION DETAILED: RIGHT ULNAR DORSAL HAND
LOCATION DETAILED: 2ND WEB SPACE LEFT HAND
LOCATION DETAILED: LEFT LATERAL DORSAL FOOT
LOCATION DETAILED: 2ND WEB SPACE RIGHT HAND
LOCATION DETAILED: 3RD WEB SPACE LEFT HAND
LOCATION DETAILED: RIGHT ANTERIOR MEDIAL PROXIMAL THIGH
LOCATION DETAILED: RIGHT INFERIOR UPPER BACK
LOCATION DETAILED: LEFT PROXIMAL DORSAL MIDDLE FINGER
LOCATION DETAILED: LEFT DISTAL CALF
LOCATION DETAILED: LEFT ULNAR DORSAL HAND
LOCATION DETAILED: RIGHT DORSAL RING METACARPOPHALANGEAL JOINT

## 2024-09-26 ASSESSMENT — LOCATION SIMPLE DESCRIPTION DERM
LOCATION SIMPLE: RIGHT THIGH
LOCATION SIMPLE: RIGHT INDEX FINGER
LOCATION SIMPLE: LEFT FOOT
LOCATION SIMPLE: RIGHT HAND
LOCATION SIMPLE: RIGHT UPPER BACK
LOCATION SIMPLE: LEFT CALF
LOCATION SIMPLE: LEFT MIDDLE FINGER
LOCATION SIMPLE: LEFT HAND
LOCATION SIMPLE: LEFT INDEX FINGER

## 2024-09-26 ASSESSMENT — LOCATION ZONE DERM
LOCATION ZONE: HAND
LOCATION ZONE: TRUNK
LOCATION ZONE: FINGER
LOCATION ZONE: FEET
LOCATION ZONE: LEG

## 2024-09-26 NOTE — PROCEDURE: COSMETIC QUOTE
Misc Procedure Description: skin tag removal
Detail Level: Simple
Voluma Price Per Syringe: 500
Botox Price Per Unit: 15
Discount Percentage: 0
Notice: We have created a more complete Cosmetic Quote plan.  The procedure name is also Cosmetic Quote.  Please review the new plan and hide the Cosmetic Quote plan you do not want to use.

## 2024-09-26 NOTE — PROCEDURE: PRESCRIPTION MEDICATION MANAGEMENT
Detail Level: Zone
Render In Strict Bullet Format?: No
Initiate Treatment: butenafine 1 % topical cream TP Frequency: Bid Sig: Apply to AA on right leg once daily x 2-3 weeks or clear
Initiate Treatment: Imiquidmod 5% cream: Apply to warts on hands qHS on Monday, Wednesday, Friday until resolved. Start 1 week after freezing.

## 2024-09-26 NOTE — HPI: BODY LOCATION - LEG
How Severe Is Your Condition?: mild
Year Removed: 1900
Additional History: Pt reports he has noticed a lesion on the left leg and wants it to be evaluated

## 2024-09-26 NOTE — HPI: WART (PATIENT REPORTED)
Where Is Your Wart Located?: hands and feet
List Over The Counter Wart Treatments You Are Currently Using (Separate Each Name With A Comma):: wart spray

## 2024-09-26 NOTE — PROCEDURE: LIQUID NITROGEN
Medical Necessity Information: It is in your best interest to select a reason for this procedure from the list below. All of these items fulfill various CMS LCD requirements except the new and changing color options.
Spray Paint Technique: No
Show Topical Anesthesia Variable?: Yes
Spray Paint Text: The liquid nitrogen was applied to the skin utilizing a spray paint frosting technique.
Medical Necessity Clause: This procedure was medically necessary because the lesions that were treated were:
Post-Care Instructions: I reviewed with the patient in detail post-care instructions. Patient is to wear sunprotection, and avoid picking at any of the treated lesions. Pt may apply Vaseline to crusted or scabbing areas.
Number Of Freeze-Thaw Cycles: 3 freeze-thaw cycles
Duration Of Freeze Thaw-Cycle (Seconds): 3
Detail Level: Detailed
Consent: The patient's consent was obtained including but not limited to risks of crusting, scabbing, blistering, scarring, darker or lighter pigmentary change, recurrence, incomplete removal and infection.

## 2024-12-04 ENCOUNTER — TELEPHONE (OUTPATIENT)
Dept: FAMILY MEDICINE | Facility: CLINIC | Age: 59
End: 2024-12-04

## 2024-12-04 NOTE — TELEPHONE ENCOUNTER
[facility] Appointment Request   Provider: Eric Beltran  Appointment Type: office  Reason for Visit: Patient has a swollen  Achilles tendon. He has no swelling or other symptom to the limbs   Available Day and Time: sometime this week  Best Contact Number: [phone]    The practice will reach out to schedule your appointment within the next 2 business days.

## 2024-12-06 ENCOUNTER — OFFICE VISIT (OUTPATIENT)
Dept: FAMILY MEDICINE | Facility: CLINIC | Age: 59
End: 2024-12-06
Payer: COMMERCIAL

## 2024-12-06 VITALS
TEMPERATURE: 97.1 F | HEART RATE: 83 BPM | SYSTOLIC BLOOD PRESSURE: 150 MMHG | BODY MASS INDEX: 34.67 KG/M2 | OXYGEN SATURATION: 97 % | DIASTOLIC BLOOD PRESSURE: 100 MMHG | RESPIRATION RATE: 16 BRPM | WEIGHT: 270 LBS

## 2024-12-06 DIAGNOSIS — R03.0 ELEVATED BLOOD PRESSURE READING: Primary | ICD-10-CM

## 2024-12-06 DIAGNOSIS — M67.972 ACHILLES TENDON DISORDER, LEFT: ICD-10-CM

## 2024-12-06 DIAGNOSIS — Z82.49 FAMILY HISTORY OF CORONARY ARTERY DISEASE: ICD-10-CM

## 2024-12-06 PROCEDURE — 3008F BODY MASS INDEX DOCD: CPT | Performed by: NURSE PRACTITIONER

## 2024-12-06 PROCEDURE — 99214 OFFICE O/P EST MOD 30 MIN: CPT | Performed by: NURSE PRACTITIONER

## 2024-12-06 ASSESSMENT — ENCOUNTER SYMPTOMS
SHORTNESS OF BREATH: 0
CONSTIPATION: 0
VOMITING: 0
HEADACHES: 0
LIGHT-HEADEDNESS: 0
WHEEZING: 0
FATIGUE: 0
NAUSEA: 0
CHILLS: 0
COUGH: 0
DIARRHEA: 0
ABDOMINAL PAIN: 0
FEVER: 0

## 2024-12-06 NOTE — PATIENT INSTRUCTIONS
L achilles tendon swelling, possible ganglion cyst. US for definition if needed, refer to podiatry, Dr Osorio.     Also CT calcium score referral for family CAD hx

## 2024-12-06 NOTE — PROGRESS NOTES
[facility]  [address] [place] [address]  [phone]     Reason for visit:   Chief Complaint   Patient presents with    Joint Swelling     Achilles area of ankle swelling x 1 month, no trauma or pain      HPI   Alvaro Novak is a 59 y.o. male who presents with L achilles swelling x 1 month, no TTP. Swelling palpable, unknown etiology        Medical History:  Past Medical History:   Diagnosis Date    Hyperlipidemia     Hypertension        Surgical History:  No past surgical history on file.    Social History:  Social History     Social History Narrative    Not on file       Family History:  Family History   Problem Relation Name Age of Onset    AVM Biological Mother      Diabetes Maternal Grandmother         Allergies:  Yellow dye    Current Medications:  Current Outpatient Medications   Medication Sig Dispense Refill    atorvastatin (LIPITOR) 10 mg tablet Take 1 tablet (10 mg total) by mouth daily. (Patient not taking: Reported on 12/6/2024) 90 tablet 1    ezetimibe (ZETIA) 10 mg tablet Take 1 tablet (10 mg total) by mouth nightly. (Patient not taking: Reported on 12/6/2024) 90 tablet 1     No current facility-administered medications for this visit.       Review of Systems:  Review of Systems   Constitutional:  Negative for chills, fatigue and fever.   Respiratory:  Negative for cough, shortness of breath and wheezing.    Cardiovascular:  Negative for chest pain.   Gastrointestinal:  Negative for abdominal pain, constipation, diarrhea, nausea and vomiting.   Musculoskeletal:         L achilles tendon swelling x 1 month   Neurological:  Negative for light-headedness and headaches.       Objective     Vital Signs:  Vitals:    12/06/24 1412   BP: (!) 150/100   Pulse:    Resp:    Temp:    SpO2:      P93, Temp 97.1F, weight 270lb, RR 16, 97%    BMI:  Body mass index is 34.67 kg/m².     Physical Exam  Constitutional:       Appearance: Normal appearance.   Cardiovascular:      Rate  and Rhythm: Normal rate and regular rhythm.      Heart sounds: Normal heart sounds.   Pulmonary:      Effort: Pulmonary effort is normal.      Breath sounds: Normal breath sounds.   Musculoskeletal:        Feet:    Feet:      Comments: L achilles tendon swelling, possible ganglion cyst? Not tender to palpation   Neurological:      General: No focal deficit present.      Mental Status: He is alert and oriented to person, place, and time.   Psychiatric:         Attention and Perception: Attention and perception normal.         Mood and Affect: Mood and affect normal.         Speech: Speech normal.         Behavior: Behavior normal. Behavior is cooperative.         Thought Content: Thought content normal.         Cognition and Memory: Cognition and memory normal.         Judgment: Judgment normal.         Recent labs before today:     Lab Results   Component Value Date    WBC 6.6 06/27/2024    HGB 16.6 06/27/2024    HCT 48.5 06/27/2024     06/27/2024    CHOL 315 (H) 06/27/2024    TRIG 86 06/27/2024    HDL 73 06/27/2024    ALT 37 06/27/2024    AST 25 06/27/2024     06/27/2024    K 5.0 06/27/2024     06/27/2024    CREATININE 1.03 06/27/2024    BUN 23 06/27/2024    CO2 24 06/27/2024    PSA 0.3 06/27/2024    HGBA1C 5.5 06/27/2024        Procedures   Assessment     Patient Instructions   L achilles tendon swelling, possible ganglion cyst. US for definition if needed, refer to podiatry, Dr Osorio.     Also CT calcium score referral for family CAD hx  [unfilled]  Problem List Items Addressed This Visit          Circulatory    Elevated blood pressure reading - Primary     Other Visit Diagnoses       Family history of coronary artery disease        Relevant Orders    CT HEART CORONARY ARTERY CALCIUM SCORE WITHOUT IV CONTRAST    Achilles tendon disorder, left        Relevant Orders    ULTRASOUND MSK ANKLE LTD LEFT    Ambulatory referral to Podiatry               The total time spent ON THE DAY OF THE VISIT  was 30 minutes, including preparing to see the patient, obtaining and reviewing separately obtained history, performing medically appropriate examination or evaluation, counseling and educating patient/family/caregiver, ordering medications, tests or procedures, referring and communicating with other health care professionals, documenting clinical information in electronic or other record, independently interpreting and communicating results to patient/family/caregiver, and/or care coordination.       Assessment & Plan  Elevated blood pressure reading    Family history of coronary artery disease    Achilles tendon disorder, left            Eric Beltran, ELKIN, CRNP  12/6/2024      This document was created using Dragon dictation software.  There might be some typographical errors due to this technology.

## 2024-12-19 ENCOUNTER — TRANSCRIBE ORDERS (OUTPATIENT)
Dept: SCHEDULING | Age: 59
End: 2024-12-19

## 2024-12-19 DIAGNOSIS — M67.472 GANGLION, LEFT ANKLE AND FOOT: Primary | ICD-10-CM

## 2024-12-19 DIAGNOSIS — M76.62 ACHILLES TENDINITIS, LEFT LEG: ICD-10-CM

## 2024-12-31 ENCOUNTER — HOSPITAL ENCOUNTER (OUTPATIENT)
Dept: RADIOLOGY | Age: 59
Discharge: HOME | End: 2024-12-31
Attending: PODIATRIST
Payer: COMMERCIAL

## 2024-12-31 DIAGNOSIS — M67.472 GANGLION, LEFT ANKLE AND FOOT: ICD-10-CM

## 2024-12-31 DIAGNOSIS — M76.62 ACHILLES TENDINITIS, LEFT LEG: ICD-10-CM

## 2025-03-27 ENCOUNTER — OFFICE VISIT (OUTPATIENT)
Dept: BEHAVIORAL HEALTH | Facility: CLINIC | Age: 60
End: 2025-03-27
Payer: COMMERCIAL

## 2025-03-27 ENCOUNTER — OFFICE VISIT (OUTPATIENT)
Dept: FAMILY MEDICINE | Facility: CLINIC | Age: 60
End: 2025-03-27
Payer: COMMERCIAL

## 2025-03-27 VITALS
SYSTOLIC BLOOD PRESSURE: 122 MMHG | BODY MASS INDEX: 34.14 KG/M2 | HEART RATE: 83 BPM | OXYGEN SATURATION: 97 % | TEMPERATURE: 98.1 F | RESPIRATION RATE: 18 BRPM | DIASTOLIC BLOOD PRESSURE: 74 MMHG | HEIGHT: 74 IN | WEIGHT: 266 LBS

## 2025-03-27 DIAGNOSIS — I48.0 PAROXYSMAL A-FIB (CMS/HCC): Primary | ICD-10-CM

## 2025-03-27 DIAGNOSIS — F10.20 ALCOHOL USE DISORDER, SEVERE, DEPENDENCE (CMS/HCC): Primary | ICD-10-CM

## 2025-03-27 DIAGNOSIS — F39 UNSPECIFIED MOOD (AFFECTIVE) DISORDER (CMS/HCC): ICD-10-CM

## 2025-03-27 PROCEDURE — 90791 PSYCH DIAGNOSTIC EVALUATION: CPT | Performed by: SOCIAL WORKER

## 2025-03-27 PROCEDURE — 3008F BODY MASS INDEX DOCD: CPT | Performed by: NURSE PRACTITIONER

## 2025-03-27 PROCEDURE — 99214 OFFICE O/P EST MOD 30 MIN: CPT | Performed by: NURSE PRACTITIONER

## 2025-03-27 RX ORDER — PRAZOSIN HYDROCHLORIDE 2 MG/1
2 CAPSULE ORAL NIGHTLY
COMMUNITY
Start: 2025-03-24 | End: 2025-04-23 | Stop reason: SDUPTHER

## 2025-03-27 RX ORDER — SERTRALINE HYDROCHLORIDE 25 MG/1
25 TABLET, FILM COATED ORAL DAILY
COMMUNITY
Start: 2025-03-24 | End: 2025-04-23 | Stop reason: SDUPTHER

## 2025-03-27 RX ORDER — DOXEPIN HYDROCHLORIDE 25 MG/1
25 CAPSULE ORAL 2 TIMES DAILY
COMMUNITY
Start: 2025-03-24 | End: 2025-04-23 | Stop reason: SDUPTHER

## 2025-03-27 ASSESSMENT — COGNITIVE AND FUNCTIONAL STATUS - GENERAL
DELUSIONS: NONE OR AGE APPROPRIATE
APPEARANCE: WELL GROOMED
THOUGHT_CONTENT: APPROPRIATE
PERCEPTUAL FUNCTION: NORMAL
THOUGHT_PROCESS: WNL
AFFECT: FULL RANGE;TEARFUL
ATTENTION: WNL
SLEEP_WAKE_CYCLE: NIGHTMARES
EST. PREMORBID INTELLIGENCE: AVERAGE
PSYCHOMOTOR FUNCTIONING: WNL
REMOTE MEMORY: WNL
ORIENTATION: FULLY ORIENTED
INSIGHT: INTACT
CONCENTRATION: WNL
SPEECH: REGULAR
MOOD: MOTIVATED;EUTHYMIC (NORMAL)
RECENT MEMORY: WNL
AROUSAL LEVEL: ALERT
EYE_CONTACT: WNL
IMPULSE CONTROL: INTACT

## 2025-03-27 NOTE — PATIENT INSTRUCTIONS
Follow up, doing well, plan to continue meds, OK to increase Minipress to 3mg nightly PRN if needed.

## 2025-03-27 NOTE — PROGRESS NOTES
Integrated Behavioral Health Outpatient Initial Visit    Visit Type Performed: In-office     Alvaro presented today for a behavioral health visit.    Clinician confirmed identification of patient by name and birthdate.      Informed Consent/Confidentiality:   Pt was explained the model of primary care behavioral health we provide at [facility], including the model of care, documentation visibility, and confidentiality:    Model of Care: This is a low-intensity model of care (we provide 8-10 visits of cognitive-behavioral therapy), and the patient has the right to other options of behavioral health care that are indicated for more severe conditions (i.e.: Traditional Outpatient psychotherapy, Intensive Outpatient Programs, Partial Hospitalization Programs, and Inpatient services).    Documentation: The psychologist/licensed behavioral health provider collaborates regularly with the pts PCP regarding the pts treatment. The integrated behavioral health progress notes are visible to the physicians and advanced practitioners in the practice where the pt is being seen, social work, [facility] Integrated Behavioral Health (IBH) providers, [facility] Behavior[facility] Services (BHS) for continuity of care if pts choose to pursue medium-term therapy through [facility], in addition to [facility]'s billing and compliance departments, as needed.     The visit diagnosis and appointment/scheduling information is visible to the patient's EPIC chart, to provide continuity of care across the [facility] system. The pt will also have access to view their notes via [facility] [employer] (pt portal).    Confidentiality: Also discussed were confidentiality and the limits of confidentiality. Information shared by the patient with the undersigned provider are kept confidential, unless the patient makes an informed written request for to have their information shared with a specific party, or if a  mandates the release of information via a court order. If the patient is at imminent  risk of suicide or homicide healthcare providers (including psychologists and therapists) may need to break confidentiality to ensure the safety of the patient or others (ie: to engage emergency services). If child, elder, or other vulnerable population abuse or neglect is reported, your healthcare providers must follow mandated reporting requirements.    Patient was given the opportunity to ask clarifying questions, and they expressed understanding and consent: Yes        SUBJECTIVE     History (as relevant to visit diagnosis, presenting problem, or treatment)    Alvaro was recently discharged from inpatient alcohol treatment program earlier this week. [facility].     History of Behavioral Health Treatment  Previous treatment:     Alvaro has been in IOP in the past with [facility] in [place].     Has used EAP resources for anxiety help     Substance Use History  ETOH/alcohol use: Alvaro reports he started drinking in his teens. Drinking has resulted in his divorce and loss of his job as a .   Other substance or supplement use: drugs: Alvaro has experimented with marijuana but does not react well and is sober.   nicotine: denied; caffeine: trying to reduce caffeine       Social History  Important people in pt's life/Support network: Alvaro got  and lost contact with his kids 12 years ago (were 10/12 at the time. They are now adults. He still grieves the loss of his children.   Alvaro also learned recently that his best man in his wedding .   He has been with his current significant other for 4 years. This has been strained recently.   Alvaro mother and step father are supportive.   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   Currently working at [employer]     Pertinent Medical History    Alvaro is addressing heart related concerns and other health needs.       Reported Symptoms    Anxiety   Anger   Nightmares/bad dreams  Grief/Loss from lack of relationship with kids   Tearfulness       OBJECTIVE     Mental Status Exam  Appearance: Well Groomed  Speech: Regular  Psychomotor Functioning: WNL  Eye Contact: WNL  Est. Premorbid Intelligence: Average  Orientation: Fully oriented  Attention: WNL  Concentration: WNL  Recent Memory: WNL  Remote Memory: WNL  Thought Content: Appropriate  Thought Process: WNL  Insight: Intact  Perceptual Function: Normal  Delusions: None or age appropriate  Sleeping: Nightmares  Affect: Full Range, Tearful  Mood: Motivated, Euthymic (normal)      ASSESSMENT     Psychotropic medications: no known adherence challenges, Prazosin has been helpful,    Current Outpatient Medications   Medication Sig Dispense Refill    atorvastatin (LIPITOR) 10 mg tablet Take 1 tablet (10 mg total) by mouth daily. (Patient not taking: Reported on 12/6/2024) 90 tablet 1    doxepin (SINEquan) 25 mg capsule Take 25 mg by mouth 2 (two) times a day.      ezetimibe (ZETIA) 10 mg tablet Take 1 tablet (10 mg total) by mouth nightly. (Patient not taking: Reported on 12/6/2024) 90 tablet 1    prazosin (MINIPRESS) 2 mg capsule Take 2 mg by mouth nightly.      sertraline (ZOLOFT) 25 mg tablet Take 25 mg by mouth daily.       No current facility-administered medications for this visit.         Suicidal Ideation/Homicidal Ideation Risk Assessment  Risk Factors: Presence of a Mood Disorder, Alcohol and/or substance abuse, and Loss (relational, social, work, or financial)  Protective factors: Effective and accessible mental health care , Connectedness to individuals, family, community, and social institutions, and Problem-solving and conflict resolution skills    Suicidal  Ideation: Not Present,   Self Injurious Behavior:  Not Present  Homicidal Ideation: Not Present  Estimate of Current Risk: Minimal risk    Plan for Safety-   N/A:  Risk is assessed to be minimal; therefore, developing a safety plan is not indicated at this time.      ASSESSMENT / IMPRESSIONS    Alvaro Novak seems to be experiencing Mood Disorder NOS as he describes anxiety, tearfulness, depressed mood/loss, and nightmares impacting sleep. He identfies ongoing grief since losing a relationship with his children 12 years ago. He is also newly sober after completing 21 days of inpatient treatment. He feels that the zoloft is helping some with mood regulation and the prazosin is helping some with nightmares. Nightmares are mostly related to events that occurred when Alvaro was a . Therapist will continue to assess mood symptoms as Alvaro adjusts to his sobriety and compa with stressors.     PLAN     Goals:    To cope with stressors including grief related to loss of children     Recommendations for treatment: Individual Therapy, 30 minutes  2 times monthly    Recommendations for Interventions: Acceptance and Commitment Therapy, Anxiety Reduction Techniques, Cognitive Behavior Therapy, Empathic Listening and Validation, Insight Development, Mindfulness, Monitoring of Symptoms, Psychoeducation, and Self-Regulation Strategies    Next visit plan    Process grief or other stressors     I spent  55 minutes on this date of service performing the following activities: obtaining history.
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
## 2025-04-02 ENCOUNTER — TELEPHONE (OUTPATIENT)
Dept: FAMILY MEDICINE | Facility: CLINIC | Age: 60
End: 2025-04-02
Payer: COMMERCIAL

## 2025-04-02 NOTE — TELEPHONE ENCOUNTER
LOV 8-11-23   RTO 11-29-23  LRF 4-11-23          Controlled Substance Monitoring:    Acute and Chronic Pain Monitoring:        No data to display Pt called and would like to know when you would like to see him again for a follow up

## 2025-04-10 ENCOUNTER — OFFICE VISIT (OUTPATIENT)
Dept: BEHAVIORAL HEALTH | Facility: CLINIC | Age: 60
End: 2025-04-10
Payer: COMMERCIAL

## 2025-04-10 DIAGNOSIS — F10.20 ALCOHOL USE DISORDER, SEVERE, DEPENDENCE (CMS/HCC): Primary | ICD-10-CM

## 2025-04-10 DIAGNOSIS — F39 UNSPECIFIED MOOD (AFFECTIVE) DISORDER (CMS/HCC): ICD-10-CM

## 2025-04-10 LAB
ALBUMIN SERPL-MCNC: 4.2 G/DL (ref 3.8–4.9)
ALP SERPL-CCNC: 77 IU/L (ref 44–121)
ALT SERPL-CCNC: 36 IU/L (ref 0–44)
AST SERPL-CCNC: 40 IU/L (ref 0–40)
BILIRUB SERPL-MCNC: 0.5 MG/DL (ref 0–1.2)
BUN SERPL-MCNC: 15 MG/DL (ref 6–24)
BUN/CREAT SERPL: 15 (ref 9–20)
CALCIUM SERPL-MCNC: 9.2 MG/DL (ref 8.7–10.2)
CHLORIDE SERPL-SCNC: 107 MMOL/L (ref 96–106)
CHOLEST SERPL-MCNC: 250 MG/DL (ref 100–199)
CO2 SERPL-SCNC: 20 MMOL/L (ref 20–29)
CREAT SERPL-MCNC: 1.03 MG/DL (ref 0.76–1.27)
EGFRCR SERPLBLD CKD-EPI 2021: 84 ML/MIN/1.73
ERYTHROCYTE [DISTWIDTH] IN BLOOD BY AUTOMATED COUNT: 12 % (ref 11.6–15.4)
GLOBULIN SER CALC-MCNC: 2.4 G/DL (ref 1.5–4.5)
GLUCOSE SERPL-MCNC: 99 MG/DL (ref 70–99)
HBA1C MFR BLD: 5.5 % (ref 4.8–5.6)
HCT VFR BLD AUTO: 45.8 % (ref 37.5–51)
HDLC SERPL-MCNC: 48 MG/DL
HGB BLD-MCNC: 15.9 G/DL (ref 13–17.7)
LDLC SERPL CALC-MCNC: 185 MG/DL (ref 0–99)
MCH RBC QN AUTO: 31.8 PG (ref 26.6–33)
MCHC RBC AUTO-ENTMCNC: 34.7 G/DL (ref 31.5–35.7)
MCV RBC AUTO: 92 FL (ref 79–97)
PLATELET # BLD AUTO: 276 X10E3/UL (ref 150–450)
POTASSIUM SERPL-SCNC: 4.6 MMOL/L (ref 3.5–5.2)
PROT SERPL-MCNC: 6.6 G/DL (ref 6–8.5)
RBC # BLD AUTO: 5 X10E6/UL (ref 4.14–5.8)
SODIUM SERPL-SCNC: 139 MMOL/L (ref 134–144)
TRIGL SERPL-MCNC: 95 MG/DL (ref 0–149)
VLDLC SERPL CALC-MCNC: 17 MG/DL (ref 5–40)
WBC # BLD AUTO: 4.5 X10E3/UL (ref 3.4–10.8)

## 2025-04-10 PROCEDURE — 90834 PSYTX W PT 45 MINUTES: CPT | Performed by: SOCIAL WORKER

## 2025-04-10 NOTE — PROGRESS NOTES
Integrated Behavioral Health Follow-up Visit Note  Visit number: 1     Visit Type Performed: In-office     Alvaro  presented today for a behavioral health visit.    SUBJECTIVE     Symptoms    Fatigue   Sleep disturbance   Pain   Difficulty managing emotions   Anxiety   Irritability     PHQ 9:  Over the last 2 weeks, how often have you been bothered by the following problems?  Little interest or pleasure in doing things: 1-->several days  Feeling down, depressed, or hopeless: 0-->not at all  Trouble falling or staying asleep, or sleeping too much: 1-->several days  Feeling tired or having little energy: 1-->several days  Poor appetite or overeatin-->not at all  Feeling bad about yourself - or that you are a failure or have let yourself or your family down: 1-->several days  Trouble concentrating on things, such as reading the newspaper or watching television: 0-->not at all  Moving or speaking so slowly that other people could have noticed. Or the opposite - being so fidgety or restless that you have been moving around a lot more than usual: 0-->not at all  Thoughts that you would be better off dead, or of hurting yourself in some way: 0-->not at all  PHQ-9: Brief Depression Severity Measure Score: 4  How difficult have these problems made it for you to do your work, take care of things at home, or get along with other people?: somewhat difficult        ERLINDA-7  Generalized Anxiety Disorder 7  Feeling nervous, anxious or on edge: 1-->Several days  Not being able to stop or control worryin-->Not at all  Worrying too much about different things: 0-->Not at all  Trouble relaxin-->Not at all  Being so restless that it is hard to sit still: 0-->Not at all  Becoming easily annoyed or irritable: 1-->Several days  Feeling afraid as if something awful might happen: 1-->Several days  GAD7 Total Score: : 3           OBJECTIVE     Mental Status Evaluation  Patient's mood and affect were consistent with the context,  and consistent with their baseline: Yes   Comments:  calm and pleasant, awake and alert; oriented to person, place, and time    Suicidal Ideation/Homicidal Ideation Risk Assessment: not assessed. If not assessed, reason:    Assessment of SI/HI is not indicated at this time, based on prior assessments (pt has denied SI/HI in all visits with the undersigned provider, pt presents with no significant risk factors, pt does exhibit significant protective factors)   Question 9 on PHQ9 was 0       Interventions  Acceptance and Adjustment, Empathic Listening and Validation, and Monitoring of Symptoms    Therapist provided empathic listening to process stressors and emotions as he adjusts to sobriety. Alvaro shared about supports via AA meetings and a [place] oriented meeting. He is finding meetings helpful but continues to look for his home AA group and sponsor. He remains committed to his sobriety.     He acknowledged limited ability to cope with emotions in the past. He processed stressors in his relationship with significant other. Therapist explored current coping strategies which include exercise, meetings, and increase boundaries as needed.     Sleep has been better and dreams have lacked triggering events. However, he continues to experience some fatigue in the morning. He is also noticing her aches/pains in knee and back that are interfering with sleep. Overall, he is finding medication effective.       Psychotropic medications: no known adherence challenges, effective   Current Outpatient Medications   Medication Sig Dispense Refill    atorvastatin (LIPITOR) 10 mg tablet Take 1 tablet (10 mg total) by mouth daily. (Patient not taking: Reported on 12/6/2024) 90 tablet 1    doxepin (SINEquan) 25 mg capsule Take 25 mg by mouth 2 (two) times a day.      ezetimibe (ZETIA) 10 mg tablet Take 1 tablet (10 mg total) by mouth nightly. (Patient not taking: Reported on 12/6/2024) 90 tablet 1    prazosin (MINIPRESS) 2 mg  capsule Take 2 mg by mouth nightly.      sertraline (ZOLOFT) 25 mg tablet Take 25 mg by mouth daily.       No current facility-administered medications for this visit.       ASSESSMENT       Progress  Patient's progress toward their goals is generally improving as Alvaro is working to label stressors and emotions. He is building supports and coping skills to maintain sobriety and cope with emotions.     PLAN     Goals:    To cope with stressors including grief related to loss of children     Recommendations  Individual Therapy  30 minutes  2 times monthly    Next visit plan:    Managing emotions    I spent  50 minutes on this date of service performing the following activities: providing counseling and education.

## 2025-04-11 LAB
APPEARANCE UR: CLEAR
BACTERIA #/AREA URNS HPF: NORMAL /[HPF]
BILIRUB UR QL STRIP: NEGATIVE
CASTS URNS QL MICRO: NORMAL /LPF
COLOR UR: YELLOW
EPI CELLS #/AREA URNS HPF: NORMAL /HPF (ref 0–10)
GLUCOSE UR QL STRIP: NEGATIVE
HGB UR QL STRIP: NEGATIVE
KETONES UR QL STRIP: NEGATIVE
LEUKOCYTE ESTERASE UR QL STRIP: NEGATIVE
MICRO URNS: NORMAL
MICRO URNS: NORMAL
NITRITE UR QL STRIP: NEGATIVE
PH UR STRIP: 6 [PH] (ref 5–7.5)
PROT UR QL STRIP: NEGATIVE
PSA SERPL-MCNC: 0.3 NG/ML (ref 0–4)
RBC #/AREA URNS HPF: NORMAL /HPF (ref 0–2)
SP GR UR STRIP: 1.02 (ref 1–1.03)
TESTOST SERPL-MCNC: 450 NG/DL (ref 264–916)
UROBILINOGEN UR STRIP-MCNC: 0.2 MG/DL (ref 0.2–1)
WBC #/AREA URNS HPF: NORMAL /HPF (ref 0–5)

## 2025-04-21 ENCOUNTER — TELEPHONE (OUTPATIENT)
Dept: FAMILY MEDICINE | Facility: CLINIC | Age: 60
End: 2025-04-21

## 2025-04-23 ENCOUNTER — OFFICE VISIT (OUTPATIENT)
Dept: FAMILY MEDICINE | Facility: CLINIC | Age: 60
End: 2025-04-23
Payer: COMMERCIAL

## 2025-04-23 VITALS
HEART RATE: 72 BPM | HEIGHT: 74 IN | RESPIRATION RATE: 18 BRPM | OXYGEN SATURATION: 97 % | DIASTOLIC BLOOD PRESSURE: 78 MMHG | BODY MASS INDEX: 33.62 KG/M2 | WEIGHT: 262 LBS | SYSTOLIC BLOOD PRESSURE: 138 MMHG | TEMPERATURE: 98 F

## 2025-04-23 DIAGNOSIS — M54.6 ACUTE MIDLINE THORACIC BACK PAIN: ICD-10-CM

## 2025-04-23 DIAGNOSIS — E78.5 HYPERLIPIDEMIA, UNSPECIFIED HYPERLIPIDEMIA TYPE: Primary | ICD-10-CM

## 2025-04-23 PROCEDURE — 3008F BODY MASS INDEX DOCD: CPT | Performed by: NURSE PRACTITIONER

## 2025-04-23 PROCEDURE — 99214 OFFICE O/P EST MOD 30 MIN: CPT | Performed by: NURSE PRACTITIONER

## 2025-04-23 RX ORDER — PRAZOSIN HYDROCHLORIDE 2 MG/1
2 CAPSULE ORAL NIGHTLY
Qty: 90 CAPSULE | Refills: 3 | Status: SHIPPED | OUTPATIENT
Start: 2025-04-23

## 2025-04-23 RX ORDER — ATORVASTATIN CALCIUM 10 MG/1
10 TABLET, FILM COATED ORAL DAILY
Qty: 90 TABLET | Refills: 1 | Status: SHIPPED | OUTPATIENT
Start: 2025-04-23 | End: 2025-10-20

## 2025-04-23 RX ORDER — DOXEPIN HYDROCHLORIDE 25 MG/1
25 CAPSULE ORAL NIGHTLY
Qty: 90 CAPSULE | Refills: 1 | Status: SHIPPED | OUTPATIENT
Start: 2025-04-23

## 2025-04-23 RX ORDER — SERTRALINE HYDROCHLORIDE 25 MG/1
25 TABLET, FILM COATED ORAL DAILY
Qty: 90 TABLET | Refills: 3 | Status: SHIPPED | OUTPATIENT
Start: 2025-04-23

## 2025-04-23 ASSESSMENT — ENCOUNTER SYMPTOMS
DIZZINESS: 0
NAUSEA: 0
FEVER: 0
CHILLS: 0
BACK PAIN: 1
SHORTNESS OF BREATH: 0
WHEEZING: 0
ABDOMINAL PAIN: 0
VOMITING: 0
FATIGUE: 0
COUGH: 0
CONSTIPATION: 0

## 2025-04-23 ASSESSMENT — PATIENT HEALTH QUESTIONNAIRE - PHQ9: SUM OF ALL RESPONSES TO PHQ9 QUESTIONS 1 & 2: 0

## 2025-04-23 NOTE — PATIENT INSTRUCTIONS
Hyperlipidemia, restart Zetia and Lipitor.     Thoracic spinal pain, XR images r/o compression fx      Ct Calcium score ordered

## 2025-04-23 NOTE — PROGRESS NOTES
[facility]  [address] [place] [address]  [phone]     Reason for visit:   Chief Complaint   Patient presents with    Follow-up      HPI   Alvaro Novak is a 59 y.o. male who presents with med refills        Medical History:  Past Medical History:   Diagnosis Date    Alcoholism (CMS/HCC)     Hyperlipidemia     Hypertension        Surgical History:  No past surgical history on file.    Social History:  Social History     Social History Narrative    Not on file       Family History:  Family History   Problem Relation Name Age of Onset    AVM Biological Mother      Diabetes Maternal Grandmother         Allergies:  Diphenhydramine and Yellow dye    Current Medications:  Current Outpatient Medications   Medication Sig Dispense Refill    atorvastatin (LIPITOR) 10 mg tablet Take 1 tablet (10 mg total) by mouth daily. 90 tablet 1    doxepin (SINEquan) 25 mg capsule Take 1 capsule (25 mg total) by mouth nightly. 90 capsule 1    prazosin (MINIPRESS) 2 mg capsule Take 1 capsule (2 mg total) by mouth nightly. 90 capsule 3    sertraline (ZOLOFT) 25 mg tablet Take 1 tablet (25 mg total) by mouth daily. 90 tablet 3    ezetimibe (ZETIA) 10 mg tablet Take 1 tablet (10 mg total) by mouth nightly. (Patient not taking: Reported on 12/6/2024) 90 tablet 1     No current facility-administered medications for this visit.       Review of Systems:  Review of Systems   Constitutional:  Negative for chills, fatigue and fever.   Respiratory:  Negative for cough, shortness of breath and wheezing.    Cardiovascular:  Negative for chest pain.   Gastrointestinal:  Negative for abdominal pain, constipation, nausea and vomiting.   Musculoskeletal:  Positive for back pain.   Neurological:  Negative for dizziness.       Objective     Vital Signs:  Vitals:    04/23/25 1102   BP: 138/78   Pulse: 72   Resp: 18   Temp: 36.7 °C (98 °F)   SpO2: 97%       BMI:  Body mass index is 33.64 kg/m².     Physical  Exam  Constitutional:       Appearance: Normal appearance.   HENT:      Head: Normocephalic and atraumatic.   Cardiovascular:      Rate and Rhythm: Normal rate and regular rhythm.      Heart sounds: Normal heart sounds.   Pulmonary:      Effort: Pulmonary effort is normal.      Breath sounds: Normal breath sounds.   Neurological:      General: No focal deficit present.      Mental Status: He is alert and oriented to person, place, and time.   Psychiatric:         Attention and Perception: Attention and perception normal.         Mood and Affect: Mood and affect normal.         Speech: Speech normal.         Behavior: Behavior normal. Behavior is cooperative.         Thought Content: Thought content normal.         Cognition and Memory: Cognition and memory normal.         Judgment: Judgment normal.         Recent labs before today:     Lab Results   Component Value Date    WBC 4.5 04/10/2025    HGB 15.9 04/10/2025    HCT 45.8 04/10/2025     04/10/2025    CHOL 250 (H) 04/10/2025    TRIG 95 04/10/2025    HDL 48 04/10/2025    ALT 36 04/10/2025    AST 40 04/10/2025     04/10/2025    K 4.6 04/10/2025     (H) 04/10/2025    CREATININE 1.03 04/10/2025    BUN 15 04/10/2025    CO2 20 04/10/2025    PSA 0.3 04/10/2025    HGBA1C 5.5 04/10/2025        Procedures   Assessment     Patient Instructions   Hyperlipidemia, restart Zetia and Lipitor.     Thoracic spinal pain, XR images r/o compression fx      Ct Calcium score ordered      [unfilled]  Problem List Items Addressed This Visit    None  Visit Diagnoses         Hyperlipidemia, unspecified hyperlipidemia type    -  Primary    Relevant Medications    atorvastatin (LIPITOR) 10 mg tablet    Other Relevant Orders    CT HEART CORONARY ARTERY CALCIUM SCORE WITHOUT IV CONTRAST      Acute midline thoracic back pain        Relevant Orders    X-RAY THORACIC SPINE 4+ VIEWS               The total time spent ON THE DAY OF THE VISIT was 30 minutes, including preparing to  see the patient, obtaining and reviewing separately obtained history, performing medically appropriate examination or evaluation, counseling and educating patient/family/caregiver, ordering medications, tests or procedures, referring and communicating with other health care professionals, documenting clinical information in electronic or other record, independently interpreting and communicating results to patient/family/caregiver, and/or care coordination.       Assessment & Plan  Hyperlipidemia, unspecified hyperlipidemia type    Acute midline thoracic back pain            Eric Beltran DNP, [employer]  4/23/2025      This document was created using Dragon dictation software.  There might be some typographical errors due to this technology.

## 2025-04-24 ENCOUNTER — OFFICE VISIT (OUTPATIENT)
Dept: BEHAVIORAL HEALTH | Facility: CLINIC | Age: 60
End: 2025-04-24
Payer: COMMERCIAL

## 2025-04-24 DIAGNOSIS — F10.20 ALCOHOL USE DISORDER, SEVERE, DEPENDENCE (CMS/HCC): ICD-10-CM

## 2025-04-24 DIAGNOSIS — F39 UNSPECIFIED MOOD (AFFECTIVE) DISORDER (CMS/HCC): Primary | ICD-10-CM

## 2025-04-24 PROCEDURE — 90837 PSYTX W PT 60 MINUTES: CPT | Performed by: SOCIAL WORKER

## 2025-04-24 NOTE — PROGRESS NOTES
Integrated Behavioral Health Follow-up Visit Note  Visit number: 2    Visit Type Performed: In-office     Alvaro  presented today for a behavioral health visit.    SUBJECTIVE     Symptoms    Fatigue   Sleep disturbance   Pain   Difficulty managing emotions   Anxiety   Irritability       OBJECTIVE     Mental Status Evaluation  Patient's mood and affect were consistent with the context, and consistent with their baseline: Yes   Comments:  calm and pleasant, awake and alert; oriented to person, place, and time    Suicidal Ideation/Homicidal Ideation Risk Assessment: not assessed. If not assessed, reason:    Assessment of SI/HI is not indicated at this time, based on prior assessments (pt has denied SI/HI in all visits with the undersigned provider, pt presents with no significant risk factors, pt does exhibit significant protective factors)       Interventions  Acceptance and Adjustment, Empathic Listening and Validation, and Insight Development    Therapist provided empathic listening to process stressors in connection to request from significant other. Therapist explored Saji' reaction to her request.     Therapist assisted Alvaro in identifying core elements of risk and trust in his personality and how he has chosen his career path and made other decisions. He did share that he feels a little uncertain about his place in the world currently, this may be playing out in his dreams. Therapist encouraged Alvaro to identify ways to reestablish with a hobby, activity, or volunteer opportunity. Alvaro identified that equine therapy has been helpful. Therapist reflected the elements of risk and trust with horses and their therapeutic benefits.     Alvaro processed pros/cons of AA supports. He is struggling to find a home group and sponsor. He is not always feeling that he can relate to others in the room. However, he is finding nature helpful and is trying to reflect on the daily positives. Therapist encouraged  him to continue to identify supports as he is celebrating 30 days since rehab d/c today.       Psychotropic medications: no known adherence challenges, effective   Current Outpatient Medications   Medication Sig Dispense Refill    atorvastatin (LIPITOR) 10 mg tablet Take 1 tablet (10 mg total) by mouth daily. 90 tablet 1    doxepin (SINEquan) 25 mg capsule Take 1 capsule (25 mg total) by mouth nightly. 90 capsule 1    ezetimibe (ZETIA) 10 mg tablet Take 1 tablet (10 mg total) by mouth nightly. (Patient not taking: Reported on 12/6/2024) 90 tablet 1    prazosin (MINIPRESS) 2 mg capsule Take 1 capsule (2 mg total) by mouth nightly. 90 capsule 3    sertraline (ZOLOFT) 25 mg tablet Take 1 tablet (25 mg total) by mouth daily. 90 tablet 3     No current facility-administered medications for this visit.       ASSESSMENT       Progress  Patient's progress toward their goals is generally improving as Alvaro is working to establish new activities and supports.     PLAN     Goals:    To cope with stressors and maintain sobriety   To process grief related to loss of children     Recommendations  Individual Therapy  30 minutes  2 times monthly    Next visit plan:    Managing emotions    I spent  55 minutes on this date of service performing the following activities: providing counseling and education.

## 2025-05-08 ENCOUNTER — OFFICE VISIT (OUTPATIENT)
Dept: BEHAVIORAL HEALTH | Facility: CLINIC | Age: 60
End: 2025-05-08
Payer: COMMERCIAL

## 2025-05-08 DIAGNOSIS — F39 UNSPECIFIED MOOD (AFFECTIVE) DISORDER (CMS/HCC): Primary | ICD-10-CM

## 2025-05-08 DIAGNOSIS — F10.20 ALCOHOL USE DISORDER, SEVERE, DEPENDENCE (CMS/HCC): ICD-10-CM

## 2025-05-08 PROCEDURE — 90834 PSYTX W PT 45 MINUTES: CPT | Performed by: SOCIAL WORKER

## 2025-05-15 ENCOUNTER — HOSPITAL ENCOUNTER (OUTPATIENT)
Dept: RADIOLOGY | Age: 60
Discharge: HOME | End: 2025-05-15
Attending: NURSE PRACTITIONER

## 2025-05-15 ENCOUNTER — HOSPITAL ENCOUNTER (OUTPATIENT)
Dept: RADIOLOGY | Age: 60
Discharge: HOME | End: 2025-05-15
Attending: NURSE PRACTITIONER
Payer: COMMERCIAL

## 2025-05-15 DIAGNOSIS — M54.6 ACUTE MIDLINE THORACIC BACK PAIN: ICD-10-CM

## 2025-05-15 DIAGNOSIS — E78.5 HYPERLIPIDEMIA, UNSPECIFIED HYPERLIPIDEMIA TYPE: ICD-10-CM

## 2025-05-15 PROCEDURE — 75571 CT HRT W/O DYE W/CA TEST: CPT

## 2025-05-15 PROCEDURE — 72072 X-RAY EXAM THORAC SPINE 3VWS: CPT

## 2025-05-16 ENCOUNTER — RESULTS FOLLOW-UP (OUTPATIENT)
Dept: FAMILY MEDICINE | Facility: CLINIC | Age: 60
End: 2025-05-16

## 2025-07-02 ENCOUNTER — TELEPHONE (OUTPATIENT)
Dept: FAMILY MEDICINE | Facility: CLINIC | Age: 60
End: 2025-07-02
Payer: COMMERCIAL

## 2025-07-09 ENCOUNTER — TELEMEDICINE (OUTPATIENT)
Dept: FAMILY MEDICINE | Facility: CLINIC | Age: 60
End: 2025-07-09
Payer: COMMERCIAL

## 2025-07-09 DIAGNOSIS — E78.2 MIXED HYPERLIPIDEMIA: Primary | ICD-10-CM

## 2025-07-09 PROCEDURE — 99213 OFFICE O/P EST LOW 20 MIN: CPT | Mod: 95 | Performed by: NURSE PRACTITIONER

## 2025-07-09 RX ORDER — EZETIMIBE 10 MG/1
10 TABLET ORAL NIGHTLY
Qty: 90 TABLET | Refills: 1 | Status: SHIPPED | OUTPATIENT
Start: 2025-07-09 | End: 2026-01-05

## 2025-07-09 ASSESSMENT — ENCOUNTER SYMPTOMS
WHEEZING: 0
DIARRHEA: 0
FATIGUE: 0
HEADACHES: 0
ABDOMINAL PAIN: 0
CONSTIPATION: 0
CHILLS: 0
VOMITING: 0
LIGHT-HEADEDNESS: 0
COUGH: 0
NAUSEA: 0
SHORTNESS OF BREATH: 0
FEVER: 0

## 2025-07-09 NOTE — PROGRESS NOTES
[facility]  [address] [place] [address]  [phone]     Consent:    You and I are about to have a telemedicine check-in or visit. This is allowed because you are already my patient, and you have requested it. This telemedicine visit will be billed to your health insurance or you, if you are self-insured. You understand you will be responsible for any copayments or coinsurances that apply to your telemedicine visit. Before starting our telemedicine visit, I am required to get your consent for this virtual check-in or visit by telemedicine. Do you consent?    Patient Response to Request for Consent:  Yes    Verification of Patient Location:   The patient affirms they are currently located in the following state: [place]   Request for Consent:   Audio and Video Encounter   Hello, my name is CONSTANCE Geronimo.  Before we proceed, can you please verify your identification by telling me your full name and date of birth?  Can you tell me who is in the room with you?    You and I are about to have a telemedicine check-in or visit because you have requested it.  This is a live video-conference.  I am a real person, speaking to you in real time.  There is no one else with me on the video-conference. I am not recording this conversation and I am asking you not to record it.  This telemedicine visit will be billed to your health insurance or you, if you are self-insured.  You understand you will be responsible for any copayments or coinsurances that apply to your telemedicine visit.  Communication platform used for this encounter:  [employer] Video Visit (Epic Video Client)       Before starting our telemedicine visit, I am required to get your consent for this virtual check-in or visit by telemedicine. Do you consent?   Patient Response to Request for Consent:   Yes     Time Spent:     I spent 20 minutes on this date of service performing the following activities: obtaining history,  entering orders, documenting, and providing counseling and education      Reason for visit:   Chief Complaint   Patient presents with    Hyperlipidemia      HPI   Alvaro Novak is a 59 y.o. male who presents with concern about changing dosing regimen of doxepin (Sinequan) from nightly to PRN. OK to change to PRN as discussed, renew ezetimibe. Pt states he has stopped drinking and is working out frequently, and feels better.         Medical History:  Past Medical History:   Diagnosis Date    Alcoholism (CMS/HCC)     Hyperlipidemia     Hypertension        Surgical History:  No past surgical history on file.    Social History:  Social History     Social History Narrative    Not on file       Family History:  Family History   Problem Relation Name Age of Onset    AVM Biological Mother      Diabetes Maternal Grandmother         Allergies:  Diphenhydramine and Yellow dye    Current Medications:  Current Outpatient Medications   Medication Sig Dispense Refill    ezetimibe (ZETIA) 10 mg tablet Take 1 tablet (10 mg total) by mouth nightly. 90 tablet 1    atorvastatin (LIPITOR) 10 mg tablet Take 1 tablet (10 mg total) by mouth daily. 90 tablet 1    doxepin (SINEquan) 25 mg capsule Take 1 capsule (25 mg total) by mouth nightly. 90 capsule 1    prazosin (MINIPRESS) 2 mg capsule Take 1 capsule (2 mg total) by mouth nightly. 90 capsule 3    sertraline (ZOLOFT) 25 mg tablet Take 1 tablet (25 mg total) by mouth daily. 90 tablet 3     No current facility-administered medications for this visit.       Review of Systems:  Review of Systems   Constitutional:  Negative for chills, fatigue and fever.   Respiratory:  Negative for cough, shortness of breath and wheezing.    Cardiovascular:  Negative for chest pain.   Gastrointestinal:  Negative for abdominal pain, constipation, diarrhea, nausea and vomiting.   Neurological:  Negative for light-headedness and headaches.       Objective     Vital Signs:  There were no vitals filed for this  visit.    BMI:  There is no height or weight on file to calculate BMI.     Physical Exam    Recent labs before today:     Lab Results   Component Value Date    WBC 4.5 04/10/2025    HGB 15.9 04/10/2025    HCT 45.8 04/10/2025     04/10/2025    CHOL 250 (H) 04/10/2025    TRIG 95 04/10/2025    HDL 48 04/10/2025    ALT 36 04/10/2025    AST 40 04/10/2025     04/10/2025    K 4.6 04/10/2025     (H) 04/10/2025    CREATININE 1.03 04/10/2025    BUN 15 04/10/2025    CO2 20 04/10/2025    PSA 0.3 04/10/2025    HGBA1C 5.5 04/10/2025        Procedures   Assessment     Patient Instructions   Pt plans to change Sinequan to PRN instead of nightly, That's OK, take as needed. Other meds every day.     Trial this stretch for wrist pain:    [email or web address]-therapeutic-exercise-program-for-carpal-[email or web address]     Renewed ezetimibe. Plan to  renewals of other meds in about 10 days.     Patient confirmed name and date of birth at the beginning of the encounter.     Patient affirms he is physically present in the [facility] at this time.    Because this visit was via two-way audio and video conference, I did not physically examine the patient.         The total time spent ON THE DAY OF THE VISIT was 20 minutes, including preparing to see the patient, obtaining and reviewing separately obtained history, performing medically appropriate examination or evaluation, counseling and educating patient/family/caregiver, ordering medications, tests or procedures, referring and communicating with other health care professionals, documenting clinical information in electronic or other record, independently interpreting and communicating results to patient/family/caregiver, and/or care coordination.            Problem List Items Addressed This Visit    None  Visit Diagnoses         Mixed hyperlipidemia    -  Primary    Relevant Medications    ezetimibe (ZETIA) 10 mg tablet                      Eric Beltran, ELKIN,  CONSTANCE  7/9/2025      This document was created using Dragon dictation software.  There might be some typographical errors due to this technology.

## 2025-07-09 NOTE — PATIENT INSTRUCTIONS
Pt plans to change Sinequan to PRN instead of nightly, That's OK, take as needed. Other meds every day.     Trial this stretch for wrist pain:    [email or web address]-therapeutic-exercise-program-for-carpal-tunnel.[email or web address]     Renewed ezetimibe. Plan to  renewals of other meds in about 10 days.     Patient confirmed name and date of birth at the beginning of the encounter.     Patient affirms he is physically present in the [facility] at this time.    Because this visit was via two-way audio and video conference, I did not physically examine the patient.         The total time spent ON THE DAY OF THE VISIT was 20 minutes, including preparing to see the patient, obtaining and reviewing separately obtained history, performing medically appropriate examination or evaluation, counseling and educating patient/family/caregiver, ordering medications, tests or procedures, referring and communicating with other health care professionals, documenting clinical information in electronic or other record, independently interpreting and communicating results to patient/family/caregiver, and/or care coordination.

## 2025-07-17 ENCOUNTER — OFFICE VISIT (OUTPATIENT)
Dept: BEHAVIORAL HEALTH | Facility: CLINIC | Age: 60
End: 2025-07-17
Payer: COMMERCIAL

## 2025-07-17 DIAGNOSIS — F39 UNSPECIFIED MOOD (AFFECTIVE) DISORDER (CMS/HCC): Primary | ICD-10-CM

## 2025-07-17 DIAGNOSIS — F10.20 ALCOHOL USE DISORDER, SEVERE, DEPENDENCE (CMS/HCC): ICD-10-CM

## 2025-07-17 PROCEDURE — 90834 PSYTX W PT 45 MINUTES: CPT | Performed by: SOCIAL WORKER

## 2025-07-17 NOTE — PROGRESS NOTES
Integrated Behavioral Health Follow-up Visit Note  Visit number: 4    Visit Type Performed: In-office     Alvaro  presented today for a behavioral health visit.    SUBJECTIVE     Symptoms    Fatigue   Sleep disturbance -nightmares, physical pain  Pain   Difficulty managing emotions   Anxiety   Irritability     PHQ 9:  Over the last 2 weeks, how often have you been bothered by the following problems?  Little interest or pleasure in doing things: 1-->several days  Feeling down, depressed, or hopeless: 1-->several days  Trouble falling or staying asleep, or sleeping too much: 2-->more than half the days  Feeling tired or having little energy: 1-->several days  Poor appetite or overeatin-->not at all  Feeling bad about yourself - or that you are a failure or have let yourself or your family down: 2-->more than half the days  Trouble concentrating on things, such as reading the newspaper or watching television: 0-->not at all  Moving or speaking so slowly that other people could have noticed. Or the opposite - being so fidgety or restless that you have been moving around a lot more than usual: 0-->not at all  Thoughts that you would be better off dead, or of hurting yourself in some way: 0-->not at all  PHQ-9: Brief Depression Severity Measure Score: 7  How difficult have these problems made it for you to do your work, take care of things at home, or get along with other people?: not difficult at all          ERLINDA-7  Generalized Anxiety Disorder 7  Feeling nervous, anxious or on edge: 0-->Not at all  Not being able to stop or control worryin-->Not at all  Worrying too much about different things: 0-->Not at all  Trouble relaxin-->Not at all  Being so restless that it is hard to sit still: 0-->Not at all  Becoming easily annoyed or irritable: 1-->Several days  Feeling afraid as if something awful might happen: 1-->Several days  GAD7 Total Score: : 2  If you checked off any problems, how difficult have these  "made it for you to do your work, take care of things at home, or get along with other people?: Not difficult at all              OBJECTIVE     Mental Status Evaluation  Patient's mood and affect were consistent with the context, and consistent with their baseline: Yes   Comments:  calm and pleasant, awake and alert; oriented to person, place, and time    Suicidal Ideation/Homicidal Ideation Risk Assessment: assessed. If not assessed, reason:    Alvaro did indicate some thoughts of death based on PHQ9 question. Therapist assessed for plan of intent related to SI. Patient does not have plan or intent and spoke about thoughts of death not SI. Mild risk; no safety plan.       Interventions  Empathic Listening and Validation, Motivational Interviewing, and Psychoeducation    Therapist provided empathic listening to process stressors including a recent loss in recovery community, political events, and relationship with significant other.  Alvaro labeled how his values are helpful and seem futile at times in the context of stressors.     Alvaro expressed an going \"fantasy\" of moving to another state and starting over. Therapist explored benefits and risks of doing so. Alvaro was able to identify that better weather, cost of living, and being away from triggering environment may be helpful. He is concerned about finding work and liking the area if he were to move. His mother is also in this area. Therapist encouraged Alvaro to explore job and housing options and to learn more about the area he is considering to make an informed decision.     Alvaro continues to experience nightmares which are causing him to avoid sleep. He is also experiencing pain in his hands and knee. Therapist provided education on EMDR and benefits.     Alvaro expressed some challenges with AA concept of making amends as it relates to the impact of his behaviors/alcoholism on self. Therapist introduced the concept of forgiveness. "     Psychotropic medications: no known adherence challenges, effective   Current Outpatient Medications   Medication Sig Dispense Refill    atorvastatin (LIPITOR) 10 mg tablet Take 1 tablet (10 mg total) by mouth daily. 90 tablet 1    doxepin (SINEquan) 25 mg capsule Take 1 capsule (25 mg total) by mouth nightly. 90 capsule 1    ezetimibe (ZETIA) 10 mg tablet Take 1 tablet (10 mg total) by mouth nightly. 90 tablet 1    prazosin (MINIPRESS) 2 mg capsule Take 1 capsule (2 mg total) by mouth nightly. 90 capsule 3    sertraline (ZOLOFT) 25 mg tablet Take 1 tablet (25 mg total) by mouth daily. 90 tablet 3     No current facility-administered medications for this visit.       ASSESSMENT       Progress  Patient's progress toward their goals is generally unchanged as his mood and stressors remain ongoing.     PLAN     Goals:    To cope with stressors and maintain sobriety   To process grief related to loss of children     Recommendations  Individual Therapy  30 minutes 2 times monthly    Next visit plan:    Process stressors   Label ambivalence    I spent 47 minutes on this date of service performing the following activities: providing counseling and education.

## 2025-08-07 ENCOUNTER — OFFICE VISIT (OUTPATIENT)
Dept: BEHAVIORAL HEALTH | Facility: CLINIC | Age: 60
End: 2025-08-07
Payer: COMMERCIAL

## 2025-08-07 DIAGNOSIS — F10.20 ALCOHOL USE DISORDER, SEVERE, DEPENDENCE (CMS/HCC): ICD-10-CM

## 2025-08-07 DIAGNOSIS — F39 UNSPECIFIED MOOD (AFFECTIVE) DISORDER (CMS/HCC): Primary | ICD-10-CM

## 2025-08-07 PROCEDURE — 90832 PSYTX W PT 30 MINUTES: CPT | Performed by: SOCIAL WORKER

## 2025-08-07 NOTE — PROGRESS NOTES
Integrated Behavioral Health Follow-up Visit Note  Visit number: 5    Visit Type Performed: In-office     Alvaro  presented today for a behavioral health visit.    SUBJECTIVE     Symptoms    Relationship stress     Fatigue   Sleep disturbance -nightmares, physical pain  Pain   Difficulty managing emotions   Anxiety   Irritability       OBJECTIVE     Mental Status Evaluation  Patient's mood and affect were consistent with the context, and consistent with their baseline: Yes   Comments:  calm and pleasant, awake and alert; oriented to person, place, and time    Suicidal Ideation/Homicidal Ideation Risk Assessment: assessed. If not assessed, reason:    Alvaro did not reports thoughts of death or SI this visit. He is more future oriented and risk is low today.       Interventions  Empathic Listening and Validation, Monitoring of Symptoms, and Psychoeducation    Therapist provided empathic listening to process recent stressors in relationship with significant other. He shared regarding reduction in trust and points of conflict.   He continues to express a desire [place] He shared about plans in preparation for this later this year potentially. Therapist identifies pros/cons. Therapist also reviewed stages of change and identified current phase as preparation.     Alvaro did report an episode of alcohol use during past vacation at request of his significant other.  Therapist assessed for thoughts and impact of use. He reports feeling in control of his use but returning to his sobriety when coming home.         Psychotropic medications: no known adherence challenges, effective   Current Outpatient Medications   Medication Sig Dispense Refill    atorvastatin (LIPITOR) 10 mg tablet Take 1 tablet (10 mg total) by mouth daily. 90 tablet 1    doxepin (SINEquan) 25 mg capsule Take 1 capsule (25 mg total) by mouth nightly. 90 capsule 1    ezetimibe (ZETIA) 10 mg tablet  Take 1 tablet (10 mg total) by mouth nightly. 90 tablet 1    prazosin (MINIPRESS) 2 mg capsule Take 1 capsule (2 mg total) by mouth nightly. 90 capsule 3    sertraline (ZOLOFT) 25 mg tablet Take 1 tablet (25 mg total) by mouth daily. 90 tablet 3     No current facility-administered medications for this visit.       ASSESSMENT       Progress  Patient's progress toward their goals is generally unchanged as his stressors remain constant but he is feeling more prepared to make change where possible.     PLAN     Goals:    To cope with stressors and maintain sobriety       Recommendations  Individual Therapy  30 minutes 2 times monthly    Next visit plan:    Process stressors   Monitor symptoms     I spent 32 minutes on this date of service performing the following activities: providing counseling and education.